# Patient Record
Sex: FEMALE | Race: WHITE | Employment: PART TIME | URBAN - METROPOLITAN AREA
[De-identification: names, ages, dates, MRNs, and addresses within clinical notes are randomized per-mention and may not be internally consistent; named-entity substitution may affect disease eponyms.]

---

## 2017-11-21 PROBLEM — M25.731 CARPAL BOSS OF RIGHT WRIST: Status: ACTIVE | Noted: 2017-11-21

## 2019-01-23 PROBLEM — N85.01 SIMPLE ENDOMETRIAL HYPERPLASIA WITHOUT ATYPIA: Status: ACTIVE | Noted: 2019-01-23

## 2019-01-23 PROBLEM — N94.10 DYSPAREUNIA, FEMALE: Status: ACTIVE | Noted: 2019-01-23

## 2019-01-25 ENCOUNTER — APPOINTMENT (OUTPATIENT)
Dept: CT IMAGING | Facility: HOSPITAL | Age: 29
End: 2019-01-25
Attending: FAMILY MEDICINE
Payer: COMMERCIAL

## 2019-01-25 ENCOUNTER — HOSPITAL ENCOUNTER (OUTPATIENT)
Age: 29
Discharge: HOME | End: 2019-01-25
Attending: FAMILY MEDICINE
Payer: COMMERCIAL

## 2019-01-25 VITALS
HEIGHT: 67 IN | OXYGEN SATURATION: 100 % | DIASTOLIC BLOOD PRESSURE: 74 MMHG | TEMPERATURE: 98 F | WEIGHT: 137 LBS | BODY MASS INDEX: 21.5 KG/M2 | RESPIRATION RATE: 18 BRPM | SYSTOLIC BLOOD PRESSURE: 107 MMHG | HEART RATE: 60 BPM

## 2019-01-25 DIAGNOSIS — K52.9 GASTROENTERITIS PRESUMED INFECTIOUS: Primary | ICD-10-CM

## 2019-01-25 DIAGNOSIS — R10.30 LOWER ABDOMINAL PAIN: ICD-10-CM

## 2019-01-25 LAB
#LYMPHOCYTE IC: 1.1 X10ˆ3/UL (ref 0.9–3.2)
#MXD IC: 0.5 X10ˆ3/UL (ref 0.1–1)
#NEUTROPHIL IC: 4.2 X10ˆ3/UL (ref 1.3–6.7)
CREAT SERPL-MCNC: 0.8 MG/DL (ref 0.55–1.02)
GLUCOSE BLD-MCNC: 77 MG/DL (ref 70–99)
HCT IC: 43.2 % (ref 37–54)
HGB IC: 13.9 G/DL (ref 12–16)
ISTAT BUN: 17 MG/DL (ref 8–20)
ISTAT CHLORIDE: 104 MMOL/L (ref 101–111)
ISTAT HEMATOCRIT: 43 % (ref 34–50)
ISTAT IONIZED CALCIUM: 1.18 MMOL/L
ISTAT POTASSIUM: 3.5 MMOL/L (ref 3.6–5.1)
ISTAT SODIUM: 141 MMOL/L (ref 136–144)
LYMPHOCYTES NFR BLD AUTO: 19.3 %
MCH IC: 30.3 PG (ref 27–33.2)
MCHC IC: 32.2 G/DL (ref 31–37)
MCV IC: 94.1 FL (ref 81–100)
MIXED CELL %: 8.5 %
NEUTROPHILS NFR BLD AUTO: 72.2 %
PLT IC: 186 X10ˆ3/UL (ref 150–450)
POCT URINE PREGNANCY: NEGATIVE
RBC IC: 4.59 X10ˆ6/UL (ref 3.8–5.1)
WBC IC: 5.8 X10ˆ3/UL (ref 4–13)

## 2019-01-25 PROCEDURE — 96374 THER/PROPH/DIAG INJ IV PUSH: CPT

## 2019-01-25 PROCEDURE — 80047 BASIC METABLC PNL IONIZED CA: CPT

## 2019-01-25 PROCEDURE — 81025 URINE PREGNANCY TEST: CPT | Performed by: FAMILY MEDICINE

## 2019-01-25 PROCEDURE — 96361 HYDRATE IV INFUSION ADD-ON: CPT

## 2019-01-25 PROCEDURE — 74177 CT ABD & PELVIS W/CONTRAST: CPT | Performed by: FAMILY MEDICINE

## 2019-01-25 PROCEDURE — 99214 OFFICE O/P EST MOD 30 MIN: CPT

## 2019-01-25 PROCEDURE — 85025 COMPLETE CBC W/AUTO DIFF WBC: CPT | Performed by: FAMILY MEDICINE

## 2019-01-25 PROCEDURE — 99204 OFFICE O/P NEW MOD 45 MIN: CPT

## 2019-01-25 RX ORDER — ONDANSETRON 4 MG/1
4 TABLET, ORALLY DISINTEGRATING ORAL EVERY 4 HOURS PRN
Qty: 10 TABLET | Refills: 0 | Status: SHIPPED | OUTPATIENT
Start: 2019-01-25 | End: 2019-02-01

## 2019-01-25 RX ORDER — KETOROLAC TROMETHAMINE 30 MG/ML
30 INJECTION, SOLUTION INTRAMUSCULAR; INTRAVENOUS ONCE
Status: COMPLETED | OUTPATIENT
Start: 2019-01-25 | End: 2019-01-25

## 2019-01-25 RX ORDER — SODIUM CHLORIDE 9 MG/ML
1000 INJECTION, SOLUTION INTRAVENOUS ONCE
Status: COMPLETED | OUTPATIENT
Start: 2019-01-25 | End: 2019-01-25

## 2019-01-25 RX ORDER — NAPROXEN 500 MG/1
500 TABLET ORAL 2 TIMES DAILY PRN
Qty: 20 TABLET | Refills: 0 | Status: SHIPPED | OUTPATIENT
Start: 2019-01-25 | End: 2019-02-01

## 2019-01-25 NOTE — ED INITIAL ASSESSMENT (HPI)
Pt arrived alert and responsive. Pt c/o diarrhea for 12 hours. Pt c/o at least 12 loose stools. Pt also c/o nausea and abdominal cramping. Pt states she is breast feeding.

## 2019-01-25 NOTE — ED NOTES
Pt to [facility] for ct abdomen. Pt instructed not to eat or drink. Pt also instructed to wait at [facility] for results to be called by Dr Marin Markham.

## 2019-01-25 NOTE — ED PROVIDER NOTES
Patient Seen in: [facility]    History   Patient presents with:  Nausea/Vomiting/Diarrhea (gastrointestinal)  Abdomen/Flank Pain (GI/)    Stated Complaint: diarrhea;body aches;abdominal pain     HPI    27-year-old female Never Smoker      Smokeless tobacco: Never Used    Alcohol use: Yes    Drug use: No      Review of Systems    Positive for stated complaint: diarrhea;body aches;abdominal pain   Other systems are as noted in HPI. Constitutional and vital signs reviewed. enterocolitis.  Clinical correlation recommended.  No large or small bowel dilatation.    No free air.  Minimal free fluid the pelvis. ABDOMINAL WALL:  Small fat containing umbilical hernia.   PELVIC ORGANS:  Decompressed urinary bladder.  Unremarkable [place] [id number]  [phone]  Go to   As needed        Medications Prescribed:  Discharge Medication List as of 1/25/2019  3:42 PM

## 2019-01-25 NOTE — ED NOTES
Pt tolerated ice chips but states it did cause her abdominal pain and has abdominal pain with anything ingested. MD notified.

## 2019-02-08 ENCOUNTER — HOSPITAL ENCOUNTER (EMERGENCY)
Age: 29
Discharge: HOME | End: 2019-02-08
Attending: EMERGENCY MEDICINE
Payer: COMMERCIAL

## 2019-02-08 ENCOUNTER — APPOINTMENT (OUTPATIENT)
Dept: GENERAL RADIOLOGY | Age: 29
End: 2019-02-08
Attending: NURSE PRACTITIONER
Payer: COMMERCIAL

## 2019-02-08 VITALS
RESPIRATION RATE: 18 BRPM | SYSTOLIC BLOOD PRESSURE: 139 MMHG | HEIGHT: 67 IN | TEMPERATURE: 99 F | DIASTOLIC BLOOD PRESSURE: 84 MMHG | WEIGHT: 135 LBS | BODY MASS INDEX: 21.19 KG/M2 | OXYGEN SATURATION: 99 % | HEART RATE: 84 BPM

## 2019-02-08 DIAGNOSIS — S61.211A LACERATION OF LEFT INDEX FINGER WITHOUT FOREIGN BODY WITHOUT DAMAGE TO NAIL, INITIAL ENCOUNTER: Primary | ICD-10-CM

## 2019-02-08 PROCEDURE — 12001 RPR S/N/AX/GEN/TRNK 2.5CM/<: CPT

## 2019-02-08 PROCEDURE — 73140 X-RAY EXAM OF FINGER(S): CPT | Performed by: NURSE PRACTITIONER

## 2019-02-08 PROCEDURE — 99283 EMERGENCY DEPT VISIT LOW MDM: CPT

## 2019-02-08 RX ORDER — IBUPROFEN 600 MG/1
600 TABLET ORAL ONCE
Status: COMPLETED | OUTPATIENT
Start: 2019-02-08 | End: 2019-02-08

## 2019-02-08 NOTE — ED PROVIDER NOTES
Patient Seen in: THE [facility] Emergency Department In [place]    History   Patient presents with:  Laceration Abrasion (integumentary)    Stated Complaint: left 2nd digit finger lac    42-year-old female presents today with injury to the left index finger. 139/84   Pulse 84   Resp 18   Temp 98.6 °F (37 °C)   Temp src Temporal   SpO2 99 %   O2 Device None (Room air)       Current:/84   Pulse 84   Temp 98.6 °F (37 °C) (Temporal)   Resp 18   Ht 170.2 cm (5' 7\")   Wt 61.2 kg   LMP 01/25/2019   SpO2 99% X-ray was done of the finger to ensure no fracture. X-ray was negative. Patient is up-to-date with Tdap.   Encouraged to do proper wound care and watch for signs of infection i.e. redness swelling or pus drainage if this happens follow with primary MD oth

## 2019-02-09 NOTE — ED PROVIDER NOTES
I reviewed that chart and discussed the case. I have examined the patient and noted laceration to lateral finger. Full range of motion. Sensation intact light touch. X-ray negative. Tetanus up-to-date. .      I agree with the following clinical impress

## 2019-02-16 ENCOUNTER — OFFICE VISIT (OUTPATIENT)
Dept: FAMILY MEDICINE CLINIC | Facility: CLINIC | Age: 29
End: 2019-02-16
Payer: COMMERCIAL

## 2019-02-16 VITALS
TEMPERATURE: 98 F | BODY MASS INDEX: 21.5 KG/M2 | WEIGHT: 137 LBS | DIASTOLIC BLOOD PRESSURE: 74 MMHG | SYSTOLIC BLOOD PRESSURE: 104 MMHG | HEIGHT: 67 IN | HEART RATE: 76 BPM | OXYGEN SATURATION: 98 %

## 2019-02-16 DIAGNOSIS — Z48.02 VISIT FOR SUTURE REMOVAL: Primary | ICD-10-CM

## 2019-02-16 PROCEDURE — 99024 POSTOP FOLLOW-UP VISIT: CPT | Performed by: NURSE PRACTITIONER

## 2019-02-16 NOTE — PROGRESS NOTES
Patient presents with:  Finger Injury: Seen in ED on 2/8 for left index finger cut. Patient was quilting and using rodery knife. Pateint denies discharge or bleeding. Patient still having some pain, denies any fever.  In today for stitch removal.       HPI:

## 2019-04-04 PROBLEM — M79.604 BILATERAL LEG PAIN: Status: ACTIVE | Noted: 2019-04-04

## 2019-04-04 PROBLEM — M54.50 BILATERAL LOW BACK PAIN WITHOUT SCIATICA: Status: ACTIVE | Noted: 2019-04-04

## 2019-04-04 PROBLEM — M79.605 BILATERAL LEG PAIN: Status: ACTIVE | Noted: 2019-04-04

## 2019-04-04 PROBLEM — M53.3 SI (SACROILIAC) JOINT DYSFUNCTION: Status: ACTIVE | Noted: 2019-04-04

## 2019-06-15 ENCOUNTER — HOSPITAL ENCOUNTER (EMERGENCY)
Age: 29
Discharge: HOME | End: 2019-06-15
Attending: EMERGENCY MEDICINE
Payer: COMMERCIAL

## 2019-06-15 VITALS
WEIGHT: 138 LBS | DIASTOLIC BLOOD PRESSURE: 86 MMHG | OXYGEN SATURATION: 99 % | BODY MASS INDEX: 22 KG/M2 | RESPIRATION RATE: 16 BRPM | HEART RATE: 80 BPM | TEMPERATURE: 98 F | SYSTOLIC BLOOD PRESSURE: 126 MMHG

## 2019-06-15 DIAGNOSIS — M12.811 ROTATOR CUFF ARTHROPATHY OF RIGHT SHOULDER: Primary | ICD-10-CM

## 2019-06-15 PROCEDURE — 99282 EMERGENCY DEPT VISIT SF MDM: CPT

## 2019-06-16 NOTE — ED PROVIDER NOTES
Patient Seen in: THE [facility] Emergency Department In [place]    History   Patient presents with:  Upper Extremity Injury (musculoskeletal)    Stated Complaint: right shoulder pain    HPI    17-year-old female presents the ER complaining of having right jerman Physical Exam  General: Nontoxic well-appearing 22-year-old female in no distress. Focused exam of the patient's right upper extremity: Good range of motion of the right shoulder is noted.   She is able to abduct and adduct the shoulder without dif

## 2019-06-16 NOTE — ED INITIAL ASSESSMENT (HPI)
States many yrs ago had a possible right shoulder rotator cuff tear. Today states heard clicking in her shoulder several times and having pain now.

## 2019-06-17 PROBLEM — M25.311 SHOULDER INSTABILITY, RIGHT: Status: ACTIVE | Noted: 2019-06-17

## 2019-06-25 PROBLEM — M75.21 BICEPS TENDINITIS, RIGHT: Status: ACTIVE | Noted: 2019-06-25

## 2019-07-08 PROCEDURE — 86376 MICROSOMAL ANTIBODY EACH: CPT | Performed by: PHYSICIAN ASSISTANT

## 2019-07-15 ENCOUNTER — APPOINTMENT (OUTPATIENT)
Dept: LAB | Facility: HOSPITAL | Age: 29
End: 2019-07-15
Attending: OBSTETRICS & GYNECOLOGY
Payer: COMMERCIAL

## 2019-07-15 DIAGNOSIS — N39.0 URINARY TRACT INFECTION WITHOUT HEMATURIA, SITE UNSPECIFIED: ICD-10-CM

## 2019-07-15 PROCEDURE — 87086 URINE CULTURE/COLONY COUNT: CPT

## 2019-10-16 ENCOUNTER — HOSPITAL ENCOUNTER (OUTPATIENT)
Dept: GENERAL RADIOLOGY | Age: 29
Discharge: HOME | End: 2019-10-16
Attending: OTOLARYNGOLOGY
Payer: COMMERCIAL

## 2019-10-16 DIAGNOSIS — R13.10 DYSPHAGIA, UNSPECIFIED TYPE: ICD-10-CM

## 2019-10-23 ENCOUNTER — HOSPITAL ENCOUNTER (OUTPATIENT)
Dept: GENERAL RADIOLOGY | Age: 29
Discharge: HOME | End: 2019-10-23
Attending: OTOLARYNGOLOGY
Payer: COMMERCIAL

## 2019-10-23 PROCEDURE — 74220 X-RAY XM ESOPHAGUS 1CNTRST: CPT | Performed by: OTOLARYNGOLOGY

## 2019-12-06 ENCOUNTER — OFFICE VISIT (OUTPATIENT)
Dept: FAMILY MEDICINE CLINIC | Facility: CLINIC | Age: 29
End: 2019-12-06
Payer: COMMERCIAL

## 2019-12-06 VITALS
TEMPERATURE: 99 F | SYSTOLIC BLOOD PRESSURE: 116 MMHG | DIASTOLIC BLOOD PRESSURE: 78 MMHG | HEIGHT: 67 IN | WEIGHT: 140 LBS | RESPIRATION RATE: 22 BRPM | BODY MASS INDEX: 21.97 KG/M2 | HEART RATE: 62 BPM | OXYGEN SATURATION: 98 %

## 2019-12-06 DIAGNOSIS — K11.21 ACUTE PAROTITIS: Primary | ICD-10-CM

## 2019-12-06 PROCEDURE — 99213 OFFICE O/P EST LOW 20 MIN: CPT | Performed by: NURSE PRACTITIONER

## 2019-12-06 RX ORDER — LORAZEPAM 0.5 MG/1
TABLET ORAL
Refills: 0 | COMMUNITY
Start: 2019-09-17 | End: 2020-06-24 | Stop reason: ALTCHOICE

## 2019-12-06 RX ORDER — SULFAMETHOXAZOLE AND TRIMETHOPRIM 800; 160 MG/1; MG/1
TABLET ORAL
Refills: 0 | COMMUNITY
Start: 2019-07-28 | End: 2019-12-06 | Stop reason: ALTCHOICE

## 2019-12-06 RX ORDER — AMOXICILLIN AND CLAVULANATE POTASSIUM 875; 125 MG/1; MG/1
1 TABLET, FILM COATED ORAL 2 TIMES DAILY
Qty: 28 TABLET | Refills: 0 | Status: SHIPPED | OUTPATIENT
Start: 2019-12-06 | End: 2019-12-20

## 2019-12-06 NOTE — PROGRESS NOTES
CHIEF COMPLAINT:   Patient presents with:  Swollen Glands: Left side behind ear,  X Today      HPI:   Opal Barragan is a 30year old female who presents to clinic today with complaints of pain behind left ear pain. Pt awoke with pain today.  Pain is de /78   Pulse 62   Temp 98.8 °F (37.1 °C) (Oral)   Resp 22   Ht 67\"   Wt 140 lb (63.5 kg)   LMP 11/29/2019   SpO2 98%   Breastfeeding No   BMI 21.93 kg/m²   GENERAL: well developed, well nourished,in no apparent distress  SKIN: no rashes,no suspicious Patient voiced understand and is in agreement with treatment plan. Patient Instructions     Salivary Gland Infection  Salivary glands make saliva. Saliva is mostly water.  It also has minerals and proteins that help break down food and keep the mouth and · If you smoke, ask your healthcare provider for help to quit. Smoking makes salivary gland stones more likely. · Keep good dental hygiene. Brush and floss your teeth daily. See your dentist for regular cleanings.   Follow-up care  Follow up with your heal

## 2020-01-07 ENCOUNTER — HOSPITAL ENCOUNTER (EMERGENCY)
Age: 30
Discharge: HOME | End: 2020-01-07
Attending: EMERGENCY MEDICINE
Payer: COMMERCIAL

## 2020-01-07 VITALS
BODY MASS INDEX: 22 KG/M2 | HEART RATE: 79 BPM | OXYGEN SATURATION: 99 % | DIASTOLIC BLOOD PRESSURE: 71 MMHG | RESPIRATION RATE: 16 BRPM | WEIGHT: 143.31 LBS | TEMPERATURE: 98 F | SYSTOLIC BLOOD PRESSURE: 118 MMHG

## 2020-01-07 DIAGNOSIS — I49.3 SYMPTOMATIC PVCS: Primary | ICD-10-CM

## 2020-01-07 LAB
ANION GAP SERPL CALC-SCNC: 7 MMOL/L (ref 0–18)
BASOPHILS # BLD AUTO: 0.04 X10(3) UL (ref 0–0.2)
BASOPHILS NFR BLD AUTO: 0.7 %
BUN BLD-MCNC: 15 MG/DL (ref 7–18)
BUN/CREAT SERPL: 18.8 (ref 10–20)
CALCIUM BLD-MCNC: 8.7 MG/DL (ref 8.5–10.1)
CHLORIDE SERPL-SCNC: 104 MMOL/L (ref 98–112)
CO2 SERPL-SCNC: 26 MMOL/L (ref 21–32)
CREAT BLD-MCNC: 0.8 MG/DL (ref 0.55–1.02)
DEPRECATED RDW RBC AUTO: 42.9 FL (ref 35.1–46.3)
EOSINOPHIL # BLD AUTO: 0.19 X10(3) UL (ref 0–0.7)
EOSINOPHIL NFR BLD AUTO: 3.4 %
ERYTHROCYTE [DISTWIDTH] IN BLOOD BY AUTOMATED COUNT: 12.4 % (ref 11–15)
GLUCOSE BLD-MCNC: 82 MG/DL (ref 70–99)
HCT VFR BLD AUTO: 41.8 % (ref 35–48)
HGB BLD-MCNC: 13.5 G/DL (ref 12–16)
IMM GRANULOCYTES # BLD AUTO: 0.01 X10(3) UL (ref 0–1)
IMM GRANULOCYTES NFR BLD: 0.2 %
LYMPHOCYTES # BLD AUTO: 2.16 X10(3) UL (ref 1–4)
LYMPHOCYTES NFR BLD AUTO: 38.8 %
MCH RBC QN AUTO: 30.2 PG (ref 26–34)
MCHC RBC AUTO-ENTMCNC: 32.3 G/DL (ref 31–37)
MCV RBC AUTO: 93.5 FL (ref 80–100)
MONOCYTES # BLD AUTO: 0.51 X10(3) UL (ref 0.1–1)
MONOCYTES NFR BLD AUTO: 9.2 %
NEUTROPHILS # BLD AUTO: 2.65 X10 (3) UL (ref 1.5–7.7)
NEUTROPHILS # BLD AUTO: 2.65 X10(3) UL (ref 1.5–7.7)
NEUTROPHILS NFR BLD AUTO: 47.7 %
OSMOLALITY SERPL CALC.SUM OF ELEC: 284 MOSM/KG (ref 275–295)
PLATELET # BLD AUTO: 164 10(3)UL (ref 150–450)
POTASSIUM SERPL-SCNC: 3.6 MMOL/L (ref 3.5–5.1)
RBC # BLD AUTO: 4.47 X10(6)UL (ref 3.8–5.3)
SODIUM SERPL-SCNC: 137 MMOL/L (ref 136–145)
TROPONIN I SERPL-MCNC: <0.045 NG/ML (ref ?–0.04)
WBC # BLD AUTO: 5.6 X10(3) UL (ref 4–11)

## 2020-01-07 PROCEDURE — 99284 EMERGENCY DEPT VISIT MOD MDM: CPT | Performed by: EMERGENCY MEDICINE

## 2020-01-07 PROCEDURE — 84484 ASSAY OF TROPONIN QUANT: CPT | Performed by: EMERGENCY MEDICINE

## 2020-01-07 PROCEDURE — 36415 COLL VENOUS BLD VENIPUNCTURE: CPT | Performed by: EMERGENCY MEDICINE

## 2020-01-07 PROCEDURE — 93005 ELECTROCARDIOGRAM TRACING: CPT

## 2020-01-07 PROCEDURE — 85025 COMPLETE CBC W/AUTO DIFF WBC: CPT | Performed by: EMERGENCY MEDICINE

## 2020-01-07 PROCEDURE — 80048 BASIC METABOLIC PNL TOTAL CA: CPT | Performed by: EMERGENCY MEDICINE

## 2020-01-07 PROCEDURE — 93010 ELECTROCARDIOGRAM REPORT: CPT | Performed by: EMERGENCY MEDICINE

## 2020-01-08 LAB
ATRIAL RATE: 81 BPM
P AXIS: 59 DEGREES
P-R INTERVAL: 160 MS
Q-T INTERVAL: 384 MS
QRS DURATION: 80 MS
QTC CALCULATION (BEZET): 446 MS
R AXIS: 23 DEGREES
T AXIS: -40 DEGREES
VENTRICULAR RATE: 81 BPM

## 2020-01-08 NOTE — ED PROVIDER NOTES
Patient Seen in: [facility] Emergency Department In [place]      History   Patient presents with:  Arrythmia/Palpitations    Stated Complaint: palpitations since Sunday    HPI    Patient reports a sensation of palpitations since Sunday.   Patient describes except as noted above.     Physical Exam     ED Triage Vitals [01/07/20 2006]   /86   Pulse 87   Resp 18   Temp 98.3 °F (36.8 °C)   Temp src Temporal   SpO2 99 %   O2 Device None (Room air)       Current:/71   Pulse 79   Temp 98.3 °F (36.8 °C) ( rate 81 bpm. MN interval 160 ms. QRS duration 80 ms. MDM     Patient describes what sounds like PVCs. Even what she describes on her ECG tracing could be PVCs.   During my evaluation, I was watching the monitor and she had no irregular beats and n

## 2020-03-07 ENCOUNTER — OFFICE VISIT (OUTPATIENT)
Dept: FAMILY MEDICINE CLINIC | Facility: CLINIC | Age: 30
End: 2020-03-07
Payer: COMMERCIAL

## 2020-03-07 VITALS
HEART RATE: 104 BPM | DIASTOLIC BLOOD PRESSURE: 60 MMHG | OXYGEN SATURATION: 99 % | RESPIRATION RATE: 18 BRPM | BODY MASS INDEX: 21.66 KG/M2 | HEIGHT: 67 IN | TEMPERATURE: 98 F | WEIGHT: 138 LBS | SYSTOLIC BLOOD PRESSURE: 108 MMHG

## 2020-03-07 DIAGNOSIS — H66.001 ACUTE SUPPURATIVE OTITIS MEDIA OF RIGHT EAR WITHOUT SPONTANEOUS RUPTURE OF TYMPANIC MEMBRANE, RECURRENCE NOT SPECIFIED: ICD-10-CM

## 2020-03-07 DIAGNOSIS — H61.21 IMPACTED CERUMEN OF RIGHT EAR: Primary | ICD-10-CM

## 2020-03-07 PROCEDURE — 69209 REMOVE IMPACTED EAR WAX UNI: CPT | Performed by: NURSE PRACTITIONER

## 2020-03-07 PROCEDURE — 99213 OFFICE O/P EST LOW 20 MIN: CPT | Performed by: NURSE PRACTITIONER

## 2020-03-07 RX ORDER — AMOXICILLIN 875 MG/1
875 TABLET, COATED ORAL 2 TIMES DAILY
Qty: 20 TABLET | Refills: 0 | Status: SHIPPED | OUTPATIENT
Start: 2020-03-07 | End: 2020-03-17

## 2020-03-16 NOTE — PROGRESS NOTES
CHIEF COMPLAINT:   Patient presents with:  Ear Problem: bilateral clogged ears, and congreation s/s for 2-3 days       HPI:   Julianna Omalley is a 26year old female who presents for upper respiratory symptoms for  3 days.  Patient reports congestion, bila Denies headaches    EXAM:   /60   Pulse 104   Temp 98.4 °F (36.9 °C) (Oral)   Resp 18   Ht 67\"   Wt 138 lb (62.6 kg)   LMP 03/05/2020 (Exact Date)   SpO2 99%   Breastfeeding No   BMI 21.61 kg/m²   GENERAL: well developed, well nourished, and in no a tablet (875 mg total) by mouth 2 (two) times daily for 10 days. Risks, benefits, and side effects of medication explained and discussed. The patient indicates understanding of these issues and agrees to the plan.   The patient is asked to return if

## 2020-09-14 PROBLEM — G57.82: Status: ACTIVE | Noted: 2020-09-14

## 2021-03-29 PROBLEM — L03.313 CELLULITIS OF CHEST WALL: Status: ACTIVE | Noted: 2021-03-29

## 2021-09-12 ENCOUNTER — OFFICE VISIT (OUTPATIENT)
Dept: FAMILY MEDICINE CLINIC | Facility: CLINIC | Age: 31
End: 2021-09-12
Payer: COMMERCIAL

## 2021-09-12 VITALS
RESPIRATION RATE: 18 BRPM | DIASTOLIC BLOOD PRESSURE: 70 MMHG | OXYGEN SATURATION: 98 % | WEIGHT: 138 LBS | BODY MASS INDEX: 21.66 KG/M2 | TEMPERATURE: 98 F | HEIGHT: 67 IN | HEART RATE: 112 BPM | SYSTOLIC BLOOD PRESSURE: 138 MMHG
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
## 2021-09-12 DIAGNOSIS — S90.562A INSECT BITE OF LEFT ANKLE, INITIAL ENCOUNTER: Primary | ICD-10-CM

## 2021-09-12 PROCEDURE — 99213 OFFICE O/P EST LOW 20 MIN: CPT | Performed by: NURSE PRACTITIONER

## 2021-09-12 PROCEDURE — 3078F DIAST BP <80 MM HG: CPT | Performed by: NURSE PRACTITIONER

## 2021-09-12 PROCEDURE — 3075F SYST BP GE 130 - 139MM HG: CPT | Performed by: NURSE PRACTITIONER

## 2021-09-12 PROCEDURE — 3008F BODY MASS INDEX DOCD: CPT | Performed by: NURSE PRACTITIONER

## 2021-09-12 NOTE — PROGRESS NOTES
Patient presents with:  Bite Sting,Insect: ankle left    HPI:     Marce Emanuel is a 33year old female who presents today with a chief complaint of insect bite to left ankle   Onset:  2 days, less than 48 hours ago  Location of bite and affected area: percussion or palpation  Lungs: clear to auscultation bilaterally  Heart: S1, S2 normal, no murmur, click, rub or gallop, regular rate and rhythm  Extremities: extremities normal, atraumatic, no cyanosis or edema  Pulses: 2+ and symmetric  Skin:  No rash n

## 2021-11-09 ENCOUNTER — ORDER TRANSCRIPTION (OUTPATIENT)
Dept: ADMINISTRATIVE | Facility: HOSPITAL | Age: 31
End: 2021-11-09

## 2021-11-09 DIAGNOSIS — Z11.59 ENCOUNTER FOR SCREENING FOR OTHER VIRAL DISEASES: ICD-10-CM

## 2021-11-09 DIAGNOSIS — Z01.818 PRE-OP TESTING: Primary | ICD-10-CM

## 2021-12-07 ENCOUNTER — LAB ENCOUNTER (OUTPATIENT)
Dept: LAB | Age: 31
End: 2021-12-07
Attending: OTOLARYNGOLOGY
Payer: COMMERCIAL

## 2021-12-07 DIAGNOSIS — Z01.818 PRE-OP TESTING: ICD-10-CM

## 2021-12-07 DIAGNOSIS — Z11.59 ENCOUNTER FOR SCREENING FOR OTHER VIRAL DISEASES: ICD-10-CM

## 2021-12-10 ENCOUNTER — HOSPITAL ENCOUNTER (OUTPATIENT)
Dept: GENERAL RADIOLOGY | Facility: HOSPITAL | Age: 31
Discharge: HOME | End: 2021-12-10
Attending: OTOLARYNGOLOGY
Payer: COMMERCIAL

## 2021-12-10 DIAGNOSIS — R13.10 DYSPHAGIA, UNSPECIFIED TYPE: ICD-10-CM

## 2021-12-10 PROCEDURE — 92611 MOTION FLUOROSCOPY/SWALLOW: CPT

## 2021-12-10 PROCEDURE — 74230 X-RAY XM SWLNG FUNCJ C+: CPT | Performed by: OTOLARYNGOLOGY

## 2021-12-10 NOTE — PROGRESS NOTES
ADULT VIDEOFLUOROSCOPIC SWALLOWING STUDY    Admission Date: 12/10/2021  Evaluation Date: 12/10/21  Radiologist: Cary Schmitt    RECOMMENDATIONS   Diet Recommendations - Solids: Regular (continue extra sauces/gravies/broth or \"wet diet\" for her comfort) Steven Michelle. Scope done and said to try prilosec for 2 weeks. Not much better. Patient currently pregnant about 15 weeks. Does also have dysphagia. For years 7-8. Pills and solids. At times with liquids.   Had Ba swallow with Dr. Steven Michelle showing mild reflu Aspiration: None  Strategy(ies) Implemented (Thin Liquids): Slow rate           PUREE  Oral Phase of Swallow (VFSS - Puree):  Within Functional Limits  Triggered at: Base of tongue  Delay (seconds):  (timely)  Residue Severity, Location:  (none)  Laryngeal premature spillage over the base of the tongue and cohesive boluses were maintained in the oral cavity until swallow reflex was triggered. Pharyngeal Phase of the Swallow:   The swallow response was timely and initiated at the level of the ramus/base of

## 2021-12-11 NOTE — PROGRESS NOTES
Please call patient. Video swallow normal.  No issues seen. Suspect sensation secondary to reflux inflammation.

## 2021-12-13 NOTE — PROGRESS NOTES
I think GI would be the next logical step. If that workup proves unfruitful or unhelpful then possibly Laryngology.

## 2021-12-13 NOTE — PROGRESS NOTES
Recommendations given to patient.  She is wondering what the next step is since she is still having issues with swallowing - to GI?

## 2022-03-15 ENCOUNTER — OFFICE VISIT (OUTPATIENT)
Dept: FAMILY MEDICINE CLINIC | Facility: CLINIC | Age: 32
End: 2022-03-15
Payer: COMMERCIAL

## 2022-03-15 VITALS
OXYGEN SATURATION: 98 % | HEART RATE: 77 BPM | HEIGHT: 67 IN | SYSTOLIC BLOOD PRESSURE: 110 MMHG | DIASTOLIC BLOOD PRESSURE: 80 MMHG | WEIGHT: 140 LBS | RESPIRATION RATE: 18 BRPM | TEMPERATURE: 98 F | BODY MASS INDEX: 21.97 KG/M2

## 2022-03-15 DIAGNOSIS — Z20.822 SUSPECTED COVID-19 VIRUS INFECTION: ICD-10-CM

## 2022-03-15 DIAGNOSIS — J01.40 ACUTE PANSINUSITIS, RECURRENCE NOT SPECIFIED: Primary | ICD-10-CM

## 2022-03-15 PROCEDURE — 99213 OFFICE O/P EST LOW 20 MIN: CPT | Performed by: NURSE PRACTITIONER

## 2022-03-15 PROCEDURE — 3079F DIAST BP 80-89 MM HG: CPT | Performed by: NURSE PRACTITIONER

## 2022-03-15 PROCEDURE — 3074F SYST BP LT 130 MM HG: CPT | Performed by: NURSE PRACTITIONER

## 2022-03-15 PROCEDURE — 3008F BODY MASS INDEX DOCD: CPT | Performed by: NURSE PRACTITIONER

## 2022-03-15 RX ORDER — AMOXICILLIN AND CLAVULANATE POTASSIUM 875; 125 MG/1; MG/1
1 TABLET, FILM COATED ORAL 2 TIMES DAILY
Qty: 20 TABLET | Refills: 0 | Status: SHIPPED | OUTPATIENT
Start: 2022-03-15 | End: 2022-03-25

## 2022-03-15 RX ORDER — BENZONATATE 200 MG/1
200 CAPSULE ORAL 3 TIMES DAILY PRN
Qty: 20 CAPSULE | Refills: 0 | Status: SHIPPED | OUTPATIENT
Start: 2022-03-15 | End: 2022-03-22

## 2022-03-15 RX ORDER — ALBUTEROL SULFATE 90 UG/1
2 AEROSOL, METERED RESPIRATORY (INHALATION) EVERY 4 HOURS PRN
Qty: 1 EACH | Refills: 0 | Status: SHIPPED | OUTPATIENT
Start: 2022-03-15 | End: 2022-03-29

## 2022-03-16 LAB — SARS-COV-2 RNA RESP QL NAA+PROBE: NOT DETECTED

## 2022-05-16 ENCOUNTER — HOSPITAL ENCOUNTER (EMERGENCY)
Age: 32
Discharge: HOME | End: 2022-05-16
Payer: COMMERCIAL

## 2022-05-16 VITALS
BODY MASS INDEX: 21.97 KG/M2 | DIASTOLIC BLOOD PRESSURE: 67 MMHG | WEIGHT: 140 LBS | HEART RATE: 82 BPM | RESPIRATION RATE: 16 BRPM | TEMPERATURE: 98 F | OXYGEN SATURATION: 100 % | SYSTOLIC BLOOD PRESSURE: 124 MMHG | HEIGHT: 67 IN

## 2022-05-16 DIAGNOSIS — S91.011A LACERATION OF RIGHT ANKLE, INITIAL ENCOUNTER: Primary | ICD-10-CM

## 2022-05-16 PROCEDURE — 12001 RPR S/N/AX/GEN/TRNK 2.5CM/<: CPT | Performed by: NURSE PRACTITIONER

## 2022-05-16 PROCEDURE — 99283 EMERGENCY DEPT VISIT LOW MDM: CPT | Performed by: NURSE PRACTITIONER

## 2022-05-16 NOTE — ED INITIAL ASSESSMENT (HPI)
Pt to ed after hitting right heel on aq back of the door, + laceration, bleeding controlled, last tetanus 3 years ago

## 2022-05-31 ENCOUNTER — TELEPHONE (OUTPATIENT)
Dept: ORTHOPEDICS CLINIC | Facility: CLINIC | Age: 32
End: 2022-05-31

## 2022-05-31 DIAGNOSIS — M25.571 RIGHT ANKLE PAIN, UNSPECIFIED CHRONICITY: Primary | ICD-10-CM

## 2022-05-31 NOTE — TELEPHONE ENCOUNTER
Please contact patient to inform them to arrive 30 min early to complete xrays and schedule xray appointment.

## 2022-06-03 ENCOUNTER — OFFICE VISIT (OUTPATIENT)
Dept: ORTHOPEDICS CLINIC | Facility: CLINIC | Age: 32
End: 2022-06-03
Payer: COMMERCIAL

## 2022-06-03 VITALS — HEIGHT: 67 IN | WEIGHT: 140 LBS | BODY MASS INDEX: 21.97 KG/M2

## 2022-06-03 DIAGNOSIS — S91.311A LACERATION OF RIGHT FOOT, INITIAL ENCOUNTER: Primary | ICD-10-CM

## 2022-06-03 DIAGNOSIS — L90.5 SCAR TISSUE: ICD-10-CM

## 2022-06-03 DIAGNOSIS — S96.911A TEAR OF TENDON OF RIGHT ANKLE, INITIAL ENCOUNTER: ICD-10-CM

## 2022-06-03 PROCEDURE — 3008F BODY MASS INDEX DOCD: CPT | Performed by: PODIATRIST

## 2022-06-03 PROCEDURE — 99204 OFFICE O/P NEW MOD 45 MIN: CPT | Performed by: PODIATRIST

## 2022-06-03 RX ORDER — CLOTRIMAZOLE 1 %
1 CREAM (GRAM) TOPICAL 2 TIMES DAILY
COMMUNITY
Start: 2022-04-26

## 2022-06-17 ENCOUNTER — OFFICE VISIT (OUTPATIENT)
Dept: ORTHOPEDICS CLINIC | Facility: CLINIC | Age: 32
End: 2022-06-17
Payer: COMMERCIAL

## 2022-06-17 DIAGNOSIS — S96.911D TEAR OF TENDON OF RIGHT ANKLE, SUBSEQUENT ENCOUNTER: ICD-10-CM

## 2022-06-17 DIAGNOSIS — S91.311D LACERATION OF RIGHT FOOT, SUBSEQUENT ENCOUNTER: Primary | ICD-10-CM

## 2022-06-17 DIAGNOSIS — L90.5 SCAR TISSUE: ICD-10-CM

## 2022-06-17 PROCEDURE — 99213 OFFICE O/P EST LOW 20 MIN: CPT | Performed by: PODIATRIST

## 2022-06-17 RX ORDER — MELOXICAM 15 MG/1
15 TABLET ORAL DAILY
COMMUNITY
Start: 2022-06-06

## 2022-06-17 NOTE — PROGRESS NOTES
[facility] Progress Note    Subjective:     Juan Young is here for follow-up of the laceration posterior aspect of her right heel area. Questionable extension of the laceration to the tendon. She has had 2 sessions of therapy. She did use the boot  2 weeks    Objective:     Posterior aspect of the right heel she has palpable scar tissue. Full strength of the Achilles tendon but a little bit of discomfort with testing. Mild swelling no warmth                  Assessment/Plan:     Diagnoses and all orders for this visit:    Laceration of right foot, subsequent encounter  -     PHYSICAL THERAPY EXTERNAL    Scar tissue  -     PHYSICAL THERAPY EXTERNAL    Tear of tendon of right ankle, subsequent encounter  -     PHYSICAL THERAPY EXTERNAL        Continue with physical therapy. She did use the boot for 2 weeks which I think was helpful  What ever shoes are comfortable but no running or jumping activity as far as working out. Follow-up after physical therapy          Gibson Renae. Manasa Morton DPM  [facility]    Smart [employer]oon speech recognition software was used to prepare this note. If a word or phrase is confusing, it is likely do to a failure of recognition. Please contact me with any questions or clarifications.

## 2022-10-20 ENCOUNTER — OFFICE VISIT (OUTPATIENT)
Dept: ORTHOPEDICS CLINIC | Facility: CLINIC | Age: 32
End: 2022-10-20
Payer: COMMERCIAL

## 2022-10-20 VITALS — HEIGHT: 67 IN | BODY MASS INDEX: 21.97 KG/M2 | WEIGHT: 140 LBS

## 2022-10-20 DIAGNOSIS — S91.311S LACERATION OF RIGHT FOOT, SEQUELA: Primary | ICD-10-CM

## 2022-10-20 DIAGNOSIS — L90.5 SCAR TISSUE: ICD-10-CM

## 2022-10-20 PROBLEM — S91.311A LACERATION OF RIGHT FOOT: Status: ACTIVE | Noted: 2022-10-20

## 2022-10-20 PROCEDURE — 3008F BODY MASS INDEX DOCD: CPT | Performed by: PODIATRIST

## 2022-10-20 PROCEDURE — 99213 OFFICE O/P EST LOW 20 MIN: CPT | Performed by: PODIATRIST

## 2022-10-20 NOTE — PROGRESS NOTES
[facility] Progress Note    Subjective:     Ema Craig is here to discuss her next options for her right heel area. She had a laceration back several months ago and developed some scar tissue and tendinitis of the Achilles. She is to the point now where she is considering surgery. She had a visit with another provider and basically Jer's type of surgery was recommended and she just wants to get another opinion        Objective:     Exam area is movable sort of a scar ball the ulceration is well-healed posterior aspect of the right heel  Mild redness and swelling probably some bursitis as well                  Assessment/Plan:     Diagnoses and all orders for this visit:    Laceration of right foot, sequela    Scar tissue        We discussed how this has progressed and how it is to the point now where it bothers her and she is not able to get into shoes. She wonders if an MRI again or an ultrasound would help and I think she should just go ahead with the surgery and I discussed what would probably be done and recommended. She is having enough discomfort that it is time to proceed with that. She will follow-up with Kirill Wood. Alana Stallings DPM  [facility]    [employer] speech recognition software was used to prepare this note. If a word or phrase is confusing, it is likely do to a failure of recognition. Please contact me with any questions or clarifications.

## 2023-02-28 DIAGNOSIS — R13.10 DYSPHAGIA: Primary | ICD-10-CM

## 2023-03-24 ENCOUNTER — HOSPITAL ENCOUNTER (OUTPATIENT)
Dept: GENERAL RADIOLOGY | Age: 33
Discharge: HOME | End: 2023-03-24
Attending: INTERNAL MEDICINE

## 2023-03-24 DIAGNOSIS — R13.10 DYSPHAGIA: ICD-10-CM

## 2023-03-24 PROCEDURE — 74221 X-RAY XM ESOPHAGUS 2CNTRST: CPT

## 2023-04-03 ENCOUNTER — MED REC SCAN ONLY (OUTPATIENT)
Dept: INTEGRATIVE MEDICINE | Facility: CLINIC | Age: 33
End: 2023-04-03

## 2023-11-07 ENCOUNTER — HOSPITAL ENCOUNTER (OUTPATIENT)
Dept: CT IMAGING | Age: 33
Discharge: HOME | End: 2023-11-07
Attending: INTERNAL MEDICINE
Payer: COMMERCIAL

## 2023-11-07 DIAGNOSIS — R07.81 RIB PAIN: ICD-10-CM

## 2023-11-07 DIAGNOSIS — R10.9 ABDOMINAL PAIN: ICD-10-CM

## 2023-11-07 PROCEDURE — 74177 CT ABD & PELVIS W/CONTRAST: CPT | Performed by: INTERNAL MEDICINE

## 2023-11-07 PROCEDURE — 82565 ASSAY OF CREATININE: CPT

## 2023-11-07 PROCEDURE — 71260 CT THORAX DX C+: CPT | Performed by: INTERNAL MEDICINE

## 2023-11-07 RX ORDER — IOHEXOL 350 MG/ML
100 INJECTION, SOLUTION INTRAVENOUS
Status: COMPLETED | OUTPATIENT
Start: 2023-11-07 | End: 2023-11-07

## 2023-11-07 RX ADMIN — IOHEXOL 100 ML: 350 INJECTION, SOLUTION INTRAVENOUS at 13:35:00

## 2023-11-08 LAB
CREAT BLD-MCNC: 0.8 MG/DL
EGFRCR SERPLBLD CKD-EPI 2021: 98 ML/MIN/1.73M2 (ref 60–?)

## 2024-01-15 ENCOUNTER — E-ADVICE (OUTPATIENT)
Dept: ALLERGY | Age: 34
End: 2024-01-15

## 2024-01-17 ENCOUNTER — HOSPITAL ENCOUNTER (EMERGENCY)
Facility: HOSPITAL | Age: 34
Discharge: HOME | End: 2024-01-17
Attending: EMERGENCY MEDICINE
Payer: COMMERCIAL

## 2024-01-17 VITALS
HEIGHT: 67 IN | WEIGHT: 140 LBS | DIASTOLIC BLOOD PRESSURE: 89 MMHG | SYSTOLIC BLOOD PRESSURE: 133 MMHG | HEART RATE: 75 BPM | OXYGEN SATURATION: 98 % | BODY MASS INDEX: 21.97 KG/M2 | RESPIRATION RATE: 16 BRPM | TEMPERATURE: 98 F

## 2024-01-17 DIAGNOSIS — R00.2 PALPITATIONS: Primary | ICD-10-CM

## 2024-01-17 LAB
ALBUMIN SERPL-MCNC: 3.8 G/DL (ref 3.4–5)
ALBUMIN/GLOB SERPL: 1.2 {RATIO} (ref 1–2)
ALP LIVER SERPL-CCNC: 65 U/L
ALT SERPL-CCNC: 18 U/L
ANION GAP SERPL CALC-SCNC: 5 MMOL/L (ref 0–18)
AST SERPL-CCNC: 7 U/L (ref 15–37)
ATRIAL RATE: 68 BPM
BASOPHILS # BLD AUTO: 0.05 X10(3) UL (ref 0–0.2)
BASOPHILS NFR BLD AUTO: 1.2 %
BILIRUB SERPL-MCNC: 0.2 MG/DL (ref 0.1–2)
BUN BLD-MCNC: 10 MG/DL (ref 9–23)
CALCIUM BLD-MCNC: 8.9 MG/DL (ref 8.5–10.1)
CHLORIDE SERPL-SCNC: 105 MMOL/L (ref 98–112)
CO2 SERPL-SCNC: 30 MMOL/L (ref 21–32)
CREAT BLD-MCNC: 0.95 MG/DL
EGFRCR SERPLBLD CKD-EPI 2021: 80 ML/MIN/1.73M2 (ref 60–?)
EOSINOPHIL # BLD AUTO: 0.23 X10(3) UL (ref 0–0.7)
EOSINOPHIL NFR BLD AUTO: 5.4 %
ERYTHROCYTE [DISTWIDTH] IN BLOOD BY AUTOMATED COUNT: 12.6 %
GLOBULIN PLAS-MCNC: 3.1 G/DL (ref 2.8–4.4)
GLUCOSE BLD-MCNC: 103 MG/DL (ref 70–99)
HCT VFR BLD AUTO: 38.6 %
HGB BLD-MCNC: 12.6 G/DL
IMM GRANULOCYTES # BLD AUTO: 0.01 X10(3) UL (ref 0–1)
IMM GRANULOCYTES NFR BLD: 0.2 %
LYMPHOCYTES # BLD AUTO: 1.28 X10(3) UL (ref 1–4)
LYMPHOCYTES NFR BLD AUTO: 30.1 %
MCH RBC QN AUTO: 29.6 PG (ref 26–34)
MCHC RBC AUTO-ENTMCNC: 32.6 G/DL (ref 31–37)
MCV RBC AUTO: 90.8 FL
MONOCYTES # BLD AUTO: 0.44 X10(3) UL (ref 0.1–1)
MONOCYTES NFR BLD AUTO: 10.4 %
NEUTROPHILS # BLD AUTO: 2.24 X10 (3) UL (ref 1.5–7.7)
NEUTROPHILS # BLD AUTO: 2.24 X10(3) UL (ref 1.5–7.7)
NEUTROPHILS NFR BLD AUTO: 52.7 %
OSMOLALITY SERPL CALC.SUM OF ELEC: 289 MOSM/KG (ref 275–295)
P AXIS: 146 DEGREES
P-R INTERVAL: 152 MS
PLATELET # BLD AUTO: 161 10(3)UL (ref 150–450)
POTASSIUM SERPL-SCNC: 3.3 MMOL/L (ref 3.5–5.1)
PROT SERPL-MCNC: 6.9 G/DL (ref 6.4–8.2)
Q-T INTERVAL: 394 MS
QRS DURATION: 80 MS
QTC CALCULATION (BEZET): 418 MS
R AXIS: 146 DEGREES
RBC # BLD AUTO: 4.25 X10(6)UL
SODIUM SERPL-SCNC: 140 MMOL/L (ref 136–145)
T AXIS: 50 DEGREES
TROPONIN I SERPL HS-MCNC: 3 NG/L
VENTRICULAR RATE: 68 BPM
WBC # BLD AUTO: 4.3 X10(3) UL (ref 4–11)

## 2024-01-17 PROCEDURE — 93005 ELECTROCARDIOGRAM TRACING: CPT

## 2024-01-17 PROCEDURE — 99284 EMERGENCY DEPT VISIT MOD MDM: CPT

## 2024-01-17 PROCEDURE — 36415 COLL VENOUS BLD VENIPUNCTURE: CPT

## 2024-01-17 PROCEDURE — 85025 COMPLETE CBC W/AUTO DIFF WBC: CPT | Performed by: EMERGENCY MEDICINE

## 2024-01-17 PROCEDURE — 85025 COMPLETE CBC W/AUTO DIFF WBC: CPT

## 2024-01-17 PROCEDURE — 80053 COMPREHEN METABOLIC PANEL: CPT

## 2024-01-17 PROCEDURE — 93010 ELECTROCARDIOGRAM REPORT: CPT

## 2024-01-17 PROCEDURE — 84484 ASSAY OF TROPONIN QUANT: CPT | Performed by: EMERGENCY MEDICINE

## 2024-01-17 PROCEDURE — 80053 COMPREHEN METABOLIC PANEL: CPT | Performed by: EMERGENCY MEDICINE

## 2024-01-17 RX ORDER — PANTOPRAZOLE SODIUM 40 MG/1
40 TABLET, DELAYED RELEASE ORAL
COMMUNITY

## 2024-01-18 LAB
ATRIAL RATE: 69 BPM
P AXIS: 80 DEGREES
P-R INTERVAL: 152 MS
Q-T INTERVAL: 414 MS
QRS DURATION: 82 MS
QTC CALCULATION (BEZET): 443 MS
R AXIS: 83 DEGREES
T AXIS: 49 DEGREES
VENTRICULAR RATE: 69 BPM

## 2024-01-18 NOTE — ED INITIAL ASSESSMENT (HPI)
Patient to the ED via [facility] with c/o dizziness and rapid heart rate while driving. States she has hx of EOE. States she has had this happen before, which after a few mins she recovers. States she has been to [facility] where she rec'd a clean bill of health. Thinking this could be related to post-procedure reaction but she has not had a procedure recently. States when she was driving she felt faint, her rapid HR.

## 2024-01-18 NOTE — ED PROVIDER NOTES
Patient Seen in: [facility] Emergency Department      History     Chief Complaint   Patient presents with    Dizziness    Tachycardia     Stated Complaint: Dizzy while driving.    Subjective:   HPI    Patient is a 33-year-old female with history of eosinophilic esophagitis, hiatal hernia, anxiety among other medical pounds outlined below presents to the ED for evaluation for palpitations dizziness while driving.  Patient was on her way to get an MRI of her knee but approximately a little bit after 6 PM, patient started having palpitations heart rate was elevated she felt short of breath and dizzy but no syncope.  She pulled over an episode last about 10 minutes and started to improve.  EMS was called.  Patient went to the fire station and was feeling better had a normal bowel movement and then came to the ED for evaluation.  No vomiting diarrhea.  No syncope.  No focal motor or sensory gait vision speech problems.  She has had similar episodes of this in the past.  1 time after a EGD.  Previously has seen cardiology at [facility] and has had a Holter monitor in the past as well which has been unrevealing.  No fever or chills.  No constitutional symptoms.  No abdominal pain.  No other complaints.    Objective:   Past Medical History:   Diagnosis Date    ALLERGIC RHINITIS     Anxiety     Cyst, thyroid     has appt to see Endocrinologist February 2017    Endometrial hyperplasia     encapsalated placenta    HEADACHES     migraines    Homozygous Factor V Leiden mutation (HCC)     Polyp at cervical os     uterine polyp removal              Past Surgical History:   Procedure Laterality Date    D & C  02/2016    with hysteroscopy, benign uterine polyp    HYSTEROSCOPY  2018    D&C encapsalated placenta    OTHER SURGICAL HISTORY  12/2015    wisdom teeth    TONSILLECTOMY  1991                Social History     Socioeconomic History    Marital status:    Tobacco Use    Smoking status: Never    Smokeless tobacco: Never    Vaping Use    Vaping Use: Never used   Substance and Sexual Activity    Alcohol use: Yes     Comment: occ     Drug use: No    Sexual activity: Yes     Partners: Male     Birth control/protection: Condom              Review of Systems    Positive for stated complaint: Dizzy while driving.  Other systems are as noted in HPI.  Constitutional and vital signs reviewed.      All other systems reviewed and negative except as noted above.    Physical Exam     ED Triage Vitals [01/17/24 1935]   BP (!) 137/95   Pulse 90   Resp 17   Temp 98.2 °F (36.8 °C)   Temp src Oral   SpO2 100 %   O2 Device None (Room air)       Current:/89   Pulse 75   Temp 98.2 °F (36.8 °C) (Oral)   Resp 16   Ht 170.2 cm (5' 7\")   Wt 63.5 kg   LMP 01/17/2024 (Approximate)   SpO2 98%   BMI 21.93 kg/m²         Physical Exam  Vitals and nursing note reviewed.   Constitutional:       General: She is not in acute distress.     Appearance: She is well-developed. She is not toxic-appearing.   HENT:      Head: Normocephalic and atraumatic.   Eyes:      General: No scleral icterus.     Conjunctiva/sclera: Conjunctivae normal.   Cardiovascular:      Rate and Rhythm: Normal rate.      Heart sounds: Normal heart sounds.   Pulmonary:      Effort: Pulmonary effort is normal. No respiratory distress.      Breath sounds: Normal breath sounds.   Abdominal:      General: There is no distension.   Musculoskeletal:         General: No tenderness. Normal range of motion.      Cervical back: Normal range of motion and neck supple.   Skin:     General: Skin is warm and dry.      Findings: No rash.   Neurological:      Mental Status: She is alert and oriented to person, place, and time.      Cranial Nerves: No cranial nerve deficit.      Motor: No abnormal muscle tone.      Coordination: Coordination normal.   Psychiatric:         Mood and Affect: Mood normal.         Behavior: Behavior normal.              ED Course     Labs Reviewed   COMP METABOLIC PANEL  (14) - Abnormal; Notable for the following components:       Result Value    Glucose 103 (*)     Potassium 3.3 (*)     AST 7 (*)     All other components within normal limits   TROPONIN I HIGH SENSITIVITY - Normal   CBC WITH DIFFERENTIAL WITH PLATELET    Narrative:     The following orders were created for panel order CBC With Differential With Platelet.  Procedure                               Abnormality         Status                     ---------                               -----------         ------                     CBC W/ DIFFERENTIAL[[id number]]                              Final result                 Please view results for these tests on the individual orders.   RAINBOW DRAW LAVENDER   RAINBOW DRAW LIGHT GREEN   RAINBOW DRAW BLUE   CBC W/ DIFFERENTIAL                       MDM           -Pulse oximetry was interpreted by me and was normal.  Pulse oximeter was ordered to monitor patient for hypoxia.        -History source other than patient -partner        -Comorbidities did add complexity to the management are mentioned in the HPI above        -I personally reviewed the prior external notes and the medical record to obtain additional history -I reviewed the chart and patient  January 7, 2020 she presented to the ED for palpitations.  She had a thorough workup in the ED which suggested PVCs.      -DDX: Includes but not limited to -electrolyte disturbance, arrhythmia       Labs Reviewed   COMP METABOLIC PANEL (14) - Abnormal; Notable for the following components:       Result Value    Glucose 103 (*)     Potassium 3.3 (*)     AST 7 (*)     All other components within normal limits   TROPONIN I HIGH SENSITIVITY - Normal   CBC WITH DIFFERENTIAL WITH PLATELET    Narrative:     The following orders were created for panel order CBC With Differential With Platelet.  Procedure                               Abnormality         Status                     ---------                               -----------          ------                     CBC W/ DIFFERENTIAL[[id number]]                              Final result                 Please view results for these tests on the individual orders.   RAINBOW DRAW LAVENDER   RAINBOW DRAW LIGHT GREEN   RAINBOW DRAW BLUE   CBC W/ DIFFERENTIAL           Reviewed.  Potassium 3.3 otherwise CBC CMP troponin all negative.  EKG was also performed rate  69.  Intervals per EKG report.  Normal sinus rhythm normal EKG.      Patient is well-appearing nontoxic.  Vital stable.  No evidence of cardiac monitoring.  Etiology of palpitations unclear at this time.  While there may be some component of anxiety, she should still see cardiology and get evaluated for Holter monitoring.  She wishes to follow-up with her cardiologist at [facility].  I did did also give her [facility] follow-up with she is unable to get in with her own in a timely manner.  But this point time she is well-appearing nontoxic without any signs of distress and her work appears been unremarkable.  Patient does not have any high risk features and is stable for discharge home at this time.  Shared decision making employed patient discharged.  Condition peer                                       Medical Decision Making      Disposition and Plan     Clinical Impression:  1. Palpitations         Disposition:  Discharge  1/17/2024  8:41 pm    Follow-up:  IVORY DAIGLE  [address] [place] [id number] [phone]  Follow up  Return to the ER if you feel worse in any way, Return to the ER if you have any concerns          Medications Prescribed:  Discharge Medication List as of 1/17/2024  8:45 PM

## 2024-04-10 ENCOUNTER — OFFICE VISIT (OUTPATIENT)
Dept: HEMATOLOGY/ONCOLOGY | Age: 34
End: 2024-04-10
Attending: INTERNAL MEDICINE
Payer: COMMERCIAL

## 2024-04-10 VITALS
TEMPERATURE: 97 F | WEIGHT: 143.5 LBS | SYSTOLIC BLOOD PRESSURE: 118 MMHG | HEIGHT: 67 IN | HEART RATE: 71 BPM | RESPIRATION RATE: 18 BRPM | OXYGEN SATURATION: 99 % | BODY MASS INDEX: 22.52 KG/M2 | DIASTOLIC BLOOD PRESSURE: 80 MMHG

## 2024-04-10 DIAGNOSIS — E61.1 IRON DEFICIENCY: Primary | ICD-10-CM

## 2024-04-10 DIAGNOSIS — D68.51 FACTOR V LEIDEN MUTATION (HCC): ICD-10-CM

## 2024-04-10 DIAGNOSIS — E53.8 FOLIC ACID DEFICIENCY: ICD-10-CM

## 2024-04-10 PROCEDURE — 99205 OFFICE O/P NEW HI 60 MIN: CPT | Performed by: INTERNAL MEDICINE

## 2024-04-10 RX ORDER — FOLIC ACID 1 MG/1
1 TABLET ORAL DAILY
Qty: 30 TABLET | Refills: 11 | Status: SHIPPED | OUTPATIENT
Start: 2024-04-10

## 2024-04-10 NOTE — PROGRESS NOTES
Education Record    Learner:  Patient    Disease / Diagnosis: TOBY    Barriers / Limitations:  None   Comments:    Method:  Discussion   Comments:    General Topics:  Plan of care reviewed   Comments:    Outcome:  Shows understanding   Comments:    +fatigue. Heavy period for 2 days. Had some heart palpitations. On pantoprazole for EOE. Recent diagnosis.

## 2024-04-10 NOTE — PROGRESS NOTES
Hematology/Oncology Initial Consultation Note    Patient Name: Tessa Stapleton  Medical Record Number: [id number]    YOB: 1990   Date of Consultation: 4/10/2024   PCP: Daniel Barkley MD    Reason for Consultation:  Tessa Stapleton was seen today for the diagnosis of iron deficiency    History of Present Illness:      35 y/o F PMH heterozygous FVL who presents for evaluation of iron deficiency.    - recent labs showed mild leukopenia with normal differential, normal hemoglobin and platelet count, iron deficiency with ferritin of 6 and folic acid deficiency  - she reports she was recently diagnosed with eosinophilic esophagitis and placed on a PPI  - she notes heavy menstrual bleeding  - she modified her diet; she cut out dairy, no eggs  - FVL mutation was tested because her father had blood clots due to surgery + cancer and is on anticoagulation; had positive FVL mutation testing  - she had 2 kids via vaginal delivery  - having ongoing fatigue    Past Medical History:  Past Medical History:    ALLERGIC RHINITIS    Anxiety    Cyst, thyroid    has appt to see Endocrinologist February 2017    Endometrial hyperplasia    encapsalated placenta    HEADACHES    migraines    Homozygous Factor V Leiden mutation (HCC)    Polyp at cervical os    uterine polyp removal       Past Surgical History:   Procedure Laterality Date    D & c  02/2016    with hysteroscopy, benign uterine polyp    Hysteroscopy  2018    D&C encapsalated placenta    Other surgical history  12/2015    wisdom teeth    Tonsillectomy  1991       Home Medications:   folic acid 1 MG Oral Tab Take 1 tablet (1 mg total) by mouth daily. 30 tablet 11    pantoprazole 40 MG Oral Tab EC Take 1 tablet (40 mg total) by mouth 2 (two) times daily before meals.      sertraline 100 MG Oral Tab Take 1.5 tablets (150 mg total) by mouth.       -------  Current Outpatient Medications on File Prior to Visit   Medication Sig Dispense Refill    pantoprazole 40 MG  Oral Tab EC Take 1 tablet (40 mg total) by mouth 2 (two) times daily before meals.      sertraline 100 MG Oral Tab Take 1.5 tablets (150 mg total) by mouth.       No current facility-administered medications on file prior to visit.       Allergies:   Allergies   Allergen Reactions    Cefdinir RASH    Cefuroxime Axetil RASH    Venlafaxine OTHER (SEE COMMENTS)     shakey    Zithromax HIVES    Cefotetan RASH    Cefuroxime RASH       Psychosocial History:  Social History     Social History Narrative    Not on file     Social History     Socioeconomic History    Marital status:    Tobacco Use    Smoking status: Never    Smokeless tobacco: Never   Vaping Use    Vaping status: Never Used   Substance and Sexual Activity    Alcohol use: Yes     Comment: occ     Drug use: No    Sexual activity: Yes     Partners: Male     Birth control/protection: Condom     Social Determinants of Health      Received from [facility]    Social Connections    Received from [facility]    Housing Stability       Family Medical History:  Family History   Problem Relation Age of Onset    Cancer Maternal Grandfather         liver    Anxiety Maternal Grandfather     Cancer Paternal Grandmother         colon    Diabetes Paternal Grandfather     Heart Disorder Paternal Grandfather     Anxiety Mother     Other (Other) Mother         hypothyroid    Anxiety Maternal Grandmother        Review of Systems:  A 10-point ROS was done with pertinent positives and negative per the HPI    Vital Signs:  Height: 170.2 cm (5' 7\") (04/10 1150)  Weight: 65.1 kg (143 lb 8 oz) (04/10 1150)  BSA (Calculated - sq m): 1.76 sq meters (04/10 1150)  Pulse: 71 (04/10 1150)  BP: 118/80 (04/10 1150)  Temp: 97.3 °F (36.3 °C) (04/10 1150)  Do Not Use - Resp Rate: --  SpO2: 99 % (04/10 1150)    Wt Readings from Last 6 Encounters:   04/10/24 65.1 kg (143 lb 8 oz)   01/17/24 63.5 kg (140 lb)   10/20/22 63.5 kg (140 lb)   06/03/22 63.5 kg  (140 lb)   05/16/22 63.5 kg (140 lb)   03/15/22 63.5 kg (140 lb)       Physical Examination:  General: Patient is alert and oriented, not in acute distress  Psych: Mood and affect are appropriate  Eyes: EOMI, PERRL  ENT: Oropharynx is clear, no adenopathy  CV: no LE edema  Respiratory: Non-labored respirations  GI/Abd: Soft, non-tender, no appreciable hepatosplenomegaly  Neurological: Grossly intact   Lymphatics: No palpable cervical, supraclavicular, axillary, or inguinal lymphadenopathy  Skin: no rashes or petechiae    Laboratory:  Lab Results   Component Value Date    WBC 4.3 01/17/2024    WBC 5.6 01/07/2020    WBC 5.28 08/22/2016    HGB 12.6 01/17/2024    HGB 13.5 01/07/2020    HGB 13.7 08/22/2016    HCT 38.6 01/17/2024    MCV 90.8 01/17/2024    MCH 29.6 01/17/2024    MCHC 32.6 01/17/2024    RDW 12.6 01/17/2024    .0 01/17/2024    .0 01/07/2020     08/22/2016     Lab Results   Component Value Date     (H) 01/17/2024    BUN 10 01/17/2024    BUNCREA 18.8 01/07/2020    CREATSERUM 0.95 01/17/2024    CREATSERUM 0.80 01/07/2020    CREATSERUM 0.79 08/22/2016    ANIONGAP 5 01/17/2024    GFR >60 07/11/2008    GFRNAA 99 01/07/2020    GFRAA 114 01/07/2020    CA 8.9 01/17/2024    OSMOCALC 289 01/17/2024    ALKPHO 65 01/17/2024    AST 7 (L) 01/17/2024    ALT 18 01/17/2024    BILT 0.2 01/17/2024    TP 6.9 01/17/2024    ALB 3.8 01/17/2024    GLOBULIN 3.1 01/17/2024    AGRATIO 2.3 07/11/2008     01/17/2024    K 3.3 (L) 01/17/2024     01/17/2024    CO2 30.0 01/17/2024     No results found for: \"PTT\", \"PT\", \"INR\"    Impression & Plan:     Iron deficiency  - likely secondary to menstrual bleeding  - symptomatic; schedule 750mg IV injectafer, close monitoring in infusion for reaction. Eat 1 small cup of cashews once daily x 1 week following injectafer to prevent hypophosphatemia. Stay off oral iron to prevent exacerbation of EoE  - repeat cbc/iron/tibc/ferritin in 3 months to reassess iron  stores    Folic acid deficiency  - due to inadequate folic acid intake  - start 1mg folic acid daily    Leukopenia  - mild. Normal differential. No intervention needed.    Heterozygous FVL mutation  - she had 2 kids without any episodes of thrombosis. Unlikely to affect her but did discuss increased risk of VTE.    Jerry Bonilla MD  Hematology/Medical Oncology  [facility]

## 2024-04-12 ENCOUNTER — HOSPITAL ENCOUNTER (OUTPATIENT)
Age: 34
Discharge: HOME | End: 2024-04-12
Payer: COMMERCIAL

## 2024-04-12 ENCOUNTER — HOSPITAL ENCOUNTER (EMERGENCY)
Facility: HOSPITAL | Age: 34
Discharge: LEFT BEFORE DISCHARGE | End: 2024-04-12
Payer: COMMERCIAL

## 2024-04-12 VITALS
DIASTOLIC BLOOD PRESSURE: 77 MMHG | BODY MASS INDEX: 21.97 KG/M2 | RESPIRATION RATE: 18 BRPM | WEIGHT: 140 LBS | SYSTOLIC BLOOD PRESSURE: 121 MMHG | HEIGHT: 67 IN | TEMPERATURE: 98 F | HEART RATE: 88 BPM | OXYGEN SATURATION: 100 %

## 2024-04-12 VITALS
WEIGHT: 140 LBS | HEART RATE: 77 BPM | SYSTOLIC BLOOD PRESSURE: 130 MMHG | HEIGHT: 67 IN | TEMPERATURE: 99 F | OXYGEN SATURATION: 98 % | DIASTOLIC BLOOD PRESSURE: 78 MMHG | BODY MASS INDEX: 21.97 KG/M2 | RESPIRATION RATE: 18 BRPM

## 2024-04-12 DIAGNOSIS — M54.16 RIGHT LUMBAR RADICULOPATHY: Primary | ICD-10-CM

## 2024-04-12 LAB
B-HCG UR QL: NEGATIVE
BILIRUB UR QL STRIP.AUTO: NEGATIVE
CLARITY UR REFRACT.AUTO: CLEAR
COLOR UR AUTO: YELLOW
GLUCOSE UR STRIP.AUTO-MCNC: NEGATIVE MG/DL
KETONES UR STRIP.AUTO-MCNC: NEGATIVE MG/DL
LEUKOCYTE ESTERASE UR QL STRIP.AUTO: NEGATIVE
NITRITE UR QL STRIP.AUTO: NEGATIVE
PH UR STRIP.AUTO: 7 [PH] (ref 5–8)
PROT UR STRIP.AUTO-MCNC: NEGATIVE MG/DL
RBC UR QL AUTO: NEGATIVE
SP GR UR STRIP.AUTO: 1.01 (ref 1–1.03)
UROBILINOGEN UR STRIP.AUTO-MCNC: 0.2 MG/DL

## 2024-04-12 PROCEDURE — 81003 URINALYSIS AUTO W/O SCOPE: CPT

## 2024-04-12 PROCEDURE — 81025 URINE PREGNANCY TEST: CPT

## 2024-04-12 PROCEDURE — 99214 OFFICE O/P EST MOD 30 MIN: CPT

## 2024-04-12 PROCEDURE — 96372 THER/PROPH/DIAG INJ SC/IM: CPT

## 2024-04-12 RX ORDER — KETOROLAC TROMETHAMINE 30 MG/ML
30 INJECTION, SOLUTION INTRAMUSCULAR; INTRAVENOUS ONCE
Status: COMPLETED | OUTPATIENT
Start: 2024-04-12 | End: 2024-04-12

## 2024-04-12 RX ORDER — DEXAMETHASONE SODIUM PHOSPHATE 10 MG/ML
10 INJECTION, SOLUTION INTRAMUSCULAR; INTRAVENOUS ONCE
Status: COMPLETED | OUTPATIENT
Start: 2024-04-12 | End: 2024-04-12

## 2024-04-12 RX ORDER — METHYLPREDNISOLONE 4 MG/1
TABLET ORAL
Qty: 1 EACH | Refills: 0 | Status: SHIPPED | OUTPATIENT
Start: 2024-04-14

## 2024-04-12 RX ORDER — CYCLOBENZAPRINE HCL 10 MG
10 TABLET ORAL NIGHTLY
Qty: 10 TABLET | Refills: 0 | Status: SHIPPED | OUTPATIENT
Start: 2024-04-12 | End: 2024-04-22

## 2024-04-12 RX ORDER — DEXAMETHASONE 4 MG/1
12 TABLET ORAL ONCE
Status: DISCONTINUED | OUTPATIENT
Start: 2024-04-12 | End: 2024-04-12

## 2024-04-12 NOTE — ED PROVIDER NOTES
Patient Seen in: [facility]      History     Chief Complaint   Patient presents with    Back Pain     Stated Complaint: Back Pain    Subjective:   HPI    34-year-old female who has known history of axial back pain comes in today complaining of right lower back pain radiating to the buttocks hip and upper thigh.  Patient woke up with the pain yesterday.  Denies any fevers chills or urinary symptoms.  Denies any specific injury or trauma.  Pt has been taking ibuprofen and tylenol OTC to try to help relieve symptoms. Pt denies bowel or bladder incontinence. No severe pain, weakness, or lower extremity swelling. Pt feeling well otherwise. No urinary sx, GI sx, or URI sx at this time.     Red Flags of Back Pain Reviewed:   History of cancer: no   Pain not relieved by rest: no   HIV: denies   Unexplained weight loss: no   History of chronic infections or fever: no   Presence of worsening night time pain: no   Perianal anesthesia: denies   Bilateral sciatica/ Bilateral leg weakness: denies   Overflow incontinence: denies    Objective:   Past Medical History:    ALLERGIC RHINITIS    Anxiety    Cyst, thyroid    has appt to see Endocrinologist February 2017    Endometrial hyperplasia    encapsalated placenta    HEADACHES    migraines    Homozygous Factor V Leiden mutation (HCC)    Polyp at cervical os    uterine polyp removal              Past Surgical History:   Procedure Laterality Date    D & c  02/2016    with hysteroscopy, benign uterine polyp    Hysteroscopy  2018    D&C encapsalated placenta    Other surgical history  12/2015    wisdom teeth    Tonsillectomy  1991                Social History     Socioeconomic History    Marital status:    Tobacco Use    Smoking status: Never    Smokeless tobacco: Never   Vaping Use    Vaping status: Never Used   Substance and Sexual Activity    Alcohol use: Yes     Comment: occ     Drug use: No    Sexual activity: Yes     Partners: Male     Birth  control/protection: Condom     Social Determinants of Health      [facility]    Social Connections    Received from [facility]    Housing Stability              Review of Systems    Positive for stated complaint: Back Pain  Other systems are as noted in HPI.  Constitutional and vital signs reviewed.      All other systems reviewed and negative except as noted above.    Physical Exam     ED Triage Vitals [04/12/24 0945]   /78   Pulse 77   Resp 18   Temp 98.5 °F (36.9 °C)   Temp src Temporal   SpO2 98 %   O2 Device None (Room air)       Current:/78   Pulse 77   Temp 98.5 °F (36.9 °C) (Temporal)   Resp 18   Ht 170.2 cm (5' 7\")   Wt 63.5 kg   LMP 03/29/2024 (Approximate)   SpO2 98%   Breastfeeding No   BMI 21.93 kg/m²         Physical Exam    General Appearance:  Alert, cooperative, no distress, appropriate for age  Head:  Normocephalic, without obvious abnormality  Neck:  Supple; symmetrical, trachea midline, no adenopathy  Lungs:  Clear to auscultation bilaterally, respirations unlabored   Heart:  Regular rate & rhythm, S1 and S2 normal, no murmurs, rubs, or gallops  Abdomen:  Soft, non-tender, bowel sounds active all four quadrants, no mass or organomegaly. No rebound tenderness or guarding  Lumbar:  Limited ROM secondary to pain, no bony ttp, + ttp over lumbar paraspinal muscles, negative SLR Bilaterally, 5/5 bilateral lower extremity motor strength exam, SILT, + 2/4 bilateral patella and achilles reflexes                Lymphatic:  No adenopathy  Skin/Hair/Nails:  Skin warm, dry and intact, no rashes or abnormal dyspigmentation  Neurologic:  Alert and oriented x3,  normal strength and tone, gait steady      ED Course   Labs Reviewed - No data to display         MDM             Medical Decision Making  34-year-old female with right-sided back pain radiating down the right lower  extremity that started yesterday.  Patient has a history of back problems.  Patient denies fever and chills.  Denies bowel or bladder incontinence    Problems Addressed:  Right lumbar radiculopathy: acute illness or injury    Amount and/or Complexity of Data Reviewed  ECG/medicine tests: ordered and independent interpretation performed. Decision-making details documented in ED Course.     Details: Toradol and Decadron IM     Risk  OTC drugs.  Prescription drug management.  Risk Details: Clinical Impression: Lumbar muscular strain      The differential diagnosis before testing included sciatica, cauda equina, lumbar muscular strain, lumbar compression fracture which is a medical condition that poses a threat to life/function.     Discussed with the patient ice and moist heat to the area, we discussed anti-inflammatory medications and muscle relaxants; these medications can make you drowsy do not use these medications if you will be working or driving.  Discussed with the patient when to return to the emergency room including worsening pain, bowel and bladder changes or numbness and tingling to the perineal region    Flexeril and Medrol dose pack prescribed             Disposition and Plan     Clinical Impression:  1. Right lumbar radiculopathy         Disposition:  Discharge  4/12/2024 10:34 am    Follow-up:  Daniel Barkley MD  [id number]  [facility] [address]  [phone]          Radha Stern, CHELSEY  [address] [address]  [phone]    Schedule an appointment as soon as possible for a visit in 2 days  If symptoms worsen          Medications Prescribed:  Discharge Medication List as of 4/12/2024 10:54 AM        START taking these medications    Details   methylPREDNISolone (MEDROL) 4 MG Oral Tablet Therapy Pack Dosepack: take as directed, Normal, Disp-1 each, R-0      cyclobenzaprine 10 MG Oral Tab Take 1 tablet (10 mg total) by mouth nightly for 10 days., Normal, Disp-10  tablet, [employer]               This report has been produced using speech recognition software and may contain errors related to that system including, but not limited to, errors in grammar, punctuation, and spelling, as well as words and phrases that possibly may have been recognized inappropriately.  If there are any questions or concerns, contact the dictating provider for clarification.     NOTE: The 21st Century Cares Act makes medical notes available to patients.  Be advised that this is a medical document written in medical language and may contain abbreviations or verbiage that is unfamiliar or direct.  It is primarily intended to carry relevant historical information, physical exam findings, and the clinical assessment of the physician.

## 2024-04-12 NOTE — ED INITIAL ASSESSMENT (HPI)
R LOWER BACK PAIN, radiating to R buttocks and hip - woke up with the pain yesterday; no fever, dysuria, injury    Bulging discs L4-L5, hx sciatica

## 2024-04-12 NOTE — DISCHARGE INSTRUCTIONS
Start the medrol dose pack in 48 hours if still persistent.  No ibuprofen, Advil, Motrin, Aleve, or Aspirin while on Medrol dose pack. Take the Flexeril at bedtime as needed for muscle spasmDo not operate machinery or drive or consume alcohol while taking this medication. You may use ice or moist heat 10-15 minutes several times a day for comfort.  Ice through clothing or a towel to avoid frostbite. Do not sleep with a heating pad as this will make the spasm worse.  Change positions frequently throughout the day.  Avoid heavy lifting. Once your pain has improved, start stretching exercises. Go to the closest Emergency Department if you develop uncontrolled pain, spontaneous loss of urine or stool, numbness or weakness into your arms or legs. See your doctor in 3-5 days if not better.

## 2024-04-13 NOTE — ED INITIAL ASSESSMENT (HPI)
Pt to ER ambulatory with steady gait. Pt presents with severe right lower back/flank pain. Symptoms started yesterday morning.   Denies urinary symptoms, denies fevers.   Pt went to immediate care this morning, given steroid/pain medication but pain is getting worse, now getting a headache.

## 2024-04-15 ENCOUNTER — OFFICE VISIT (OUTPATIENT)
Dept: HEMATOLOGY/ONCOLOGY | Age: 34
End: 2024-04-15
Attending: INTERNAL MEDICINE
Payer: COMMERCIAL

## 2024-04-15 VITALS
TEMPERATURE: 99 F | SYSTOLIC BLOOD PRESSURE: 99 MMHG | HEART RATE: 74 BPM | OXYGEN SATURATION: 100 % | DIASTOLIC BLOOD PRESSURE: 64 MMHG | RESPIRATION RATE: 18 BRPM

## 2024-04-15 DIAGNOSIS — E61.1 IRON DEFICIENCY: Primary | ICD-10-CM

## 2024-04-15 PROCEDURE — 96374 THER/PROPH/DIAG INJ IV PUSH: CPT

## 2024-04-15 NOTE — PROGRESS NOTES
Education Record    Learner:  Patient    Disease / Diagnosis: patient  here for injectafer infusion     Barriers / Limitations:  None    Method:  Brief focused, printed material and  reinforcement    General Topics:  Plan of care reviewed    Outcome:  Shows understanding   Patient  tolerated infusion, no c/o. Dc'd home in stable condition.

## 2024-04-16 ENCOUNTER — TELEPHONE (OUTPATIENT)
Dept: HEMATOLOGY/ONCOLOGY | Facility: HOSPITAL | Age: 34
End: 2024-04-16

## 2024-04-16 NOTE — TELEPHONE ENCOUNTER
Toxicities: Injectafer infusion on 4/15/2024    Diarrhea    Tessa reports she had one liquid, brown stool today. She said the infusion nurse said the iron infusion can cause diarrhea. Tessa reports she has been moving her bowels daily prior to treatment. She wanted to know if there is anything she should do?    I recommended she drink plenty of caffeine free fluids. If she has 3 or more liquid stools to take a dose of loperamide. I also suggested she avoid any spicy, greasy, deep fried foods, high fiber foods and caffienated drinks today because it may make the diarrhea worse. She agreed with the plan. No other questions or concerns at this time.

## 2024-04-24 ENCOUNTER — E-ADVICE (OUTPATIENT)
Dept: ALLERGY | Age: 34
End: 2024-04-24

## 2024-04-30 ASSESSMENT — ENCOUNTER SYMPTOMS
ADENOPATHY: 0
FEVER: 0
CHEST TIGHTNESS: 0
SLEEP DISTURBANCE: 0
FACIAL SWELLING: 0
WHEEZING: 0
ABDOMINAL PAIN: 0
APPETITE CHANGE: 0
HEADACHES: 0
NERVOUS/ANXIOUS: 0
SORE THROAT: 0

## 2024-05-01 ENCOUNTER — APPOINTMENT (OUTPATIENT)
Dept: ALLERGY | Age: 34
End: 2024-05-01

## 2024-05-01 DIAGNOSIS — R13.10 DYSPHAGIA, UNSPECIFIED TYPE: ICD-10-CM

## 2024-05-01 DIAGNOSIS — K20.0 EE (EOSINOPHILIC ESOPHAGITIS): Primary | ICD-10-CM

## 2024-05-01 DIAGNOSIS — Z88.1 ALLERGY TO MULTIPLE ANTIBIOTICS: ICD-10-CM

## 2024-05-01 DIAGNOSIS — J30.9 ALLERGIC RHINOCONJUNCTIVITIS: ICD-10-CM

## 2024-05-01 DIAGNOSIS — H10.10 ALLERGIC RHINOCONJUNCTIVITIS: ICD-10-CM

## 2024-05-01 PROCEDURE — G2211 COMPLEX E/M VISIT ADD ON: HCPCS | Performed by: ALLERGY & IMMUNOLOGY

## 2024-05-01 PROCEDURE — 99205 OFFICE O/P NEW HI 60 MIN: CPT | Performed by: ALLERGY & IMMUNOLOGY

## 2024-05-01 RX ORDER — AZELASTINE HYDROCHLORIDE 0.5 MG/ML
1 SOLUTION/ DROPS OPHTHALMIC 2 TIMES DAILY PRN
Qty: 1 EACH | Refills: 3 | Status: SHIPPED | OUTPATIENT
Start: 2024-05-01

## 2024-05-01 ASSESSMENT — ENCOUNTER SYMPTOMS
VOMITING: 1
EYE ITCHING: 1
DIARRHEA: 1
RHINORRHEA: 1

## 2024-05-31 ASSESSMENT — ENCOUNTER SYMPTOMS
NAUSEA: 0
ABDOMINAL PAIN: 0
DIARRHEA: 0
SHORTNESS OF BREATH: 0
COUGH: 0
WHEEZING: 0
FATIGUE: 0
RHINORRHEA: 0
VOMITING: 0
CHILLS: 0
CHEST TIGHTNESS: 0
FEVER: 0

## 2024-06-03 ENCOUNTER — APPOINTMENT (OUTPATIENT)
Dept: ALLERGY | Age: 34
End: 2024-06-03

## 2024-06-03 ENCOUNTER — E-ADVICE (OUTPATIENT)
Dept: OTHER | Age: 34
End: 2024-06-03

## 2024-06-03 ENCOUNTER — E-ADVICE (OUTPATIENT)
Dept: ALLERGY | Age: 34
End: 2024-06-03

## 2024-06-03 DIAGNOSIS — J30.9 ALLERGIC RHINOCONJUNCTIVITIS: Primary | ICD-10-CM

## 2024-06-03 DIAGNOSIS — Z88.1 ALLERGY TO MULTIPLE ANTIBIOTICS: ICD-10-CM

## 2024-06-03 DIAGNOSIS — K20.0 EE (EOSINOPHILIC ESOPHAGITIS): ICD-10-CM

## 2024-06-03 DIAGNOSIS — H10.10 ALLERGIC RHINOCONJUNCTIVITIS: Primary | ICD-10-CM

## 2024-06-03 PROCEDURE — G2211 COMPLEX E/M VISIT ADD ON: HCPCS | Performed by: ALLERGY & IMMUNOLOGY

## 2024-06-03 PROCEDURE — 99215 OFFICE O/P EST HI 40 MIN: CPT | Performed by: ALLERGY & IMMUNOLOGY

## 2024-06-05 ENCOUNTER — LAB SERVICES (OUTPATIENT)
Dept: FAMILY MEDICINE | Age: 34
End: 2024-06-05

## 2024-06-05 DIAGNOSIS — J30.9 ALLERGIC RHINOCONJUNCTIVITIS: ICD-10-CM

## 2024-06-05 DIAGNOSIS — K20.0 EE (EOSINOPHILIC ESOPHAGITIS): ICD-10-CM

## 2024-06-05 DIAGNOSIS — H10.10 ALLERGIC RHINOCONJUNCTIVITIS: ICD-10-CM

## 2024-06-05 LAB — IGE SERPL-ACNC: <3.6 IUNITS/ML

## 2024-06-05 PROCEDURE — 82785 ASSAY OF IGE: CPT | Performed by: CLINICAL MEDICAL LABORATORY

## 2024-06-06 LAB
ALLERGEN:  CHICKEN, IGE - SEQUOIA: <0.1 KU/L
ALLERGEN:  CLADOSPORIUM HERBARUM (HORMODENDRUM), IGE - SEQUOIA: <0.1 KU/L
ALLERGEN:  CLAM, IGE - SEQUOIA: <0.1 KU/L
ALLERGEN:  COCKROACH (GERMAN), IGE - SEQUOIA: <0.1 KU/L
ALLERGEN:  COCKSFOOT GRASS (ORCHARD), IGE - SEQUOIA: <0.1 KU/L
ALLERGEN:  COD FISH, IGE - SEQUOIA: <0.1 KU/L
ALLERGEN:  CORN (MAIZE), IGE - SEQUOIA: <0.1 KU/L
ALLERGEN:  CRAB, IGE - SEQUOIA: <0.1 KU/L
ALLERGEN:  D. FARINAE, IGE - SEQUOIA: <0.1 KU/L
ALLERGEN:  D. PTERONYSSINUS, IGE - SEQUOIA: <0.1 KU/L
ALLERGEN:  DOG DANDER, IGE - SEQUOIA: <0.1 KU/L
ALLERGEN:  EGG WHITE, IGE - SEQUOIA: <0.1 KU/L
ALLERGEN:  EGG YOLK, IGE - SEQUOIA: <0.1 KU/L
ALLERGEN:  MILK, COW, IGE - SEQUOIA: <0.1 KU/L
ALLERGEN: ALMOND, IGE - SEQUOIA: <0.1 KU/L
ALLERGEN: ALTERNARIA TENUIS, IGE - SEQUOIA: <0.1 KU/L
ALLERGEN: APPLE, IGE - SEQUOIA: <0.1 KU/L
ALLERGEN: ASPERGILLUS FUMIGATUS, IGE - SEQUOIA: <0.1 KU/L
ALLERGEN: ASPERGILLUS NIGER, IGE - SEQUOIA: <0.1 KU/L
ALLERGEN: AUREOBASIDIUM PULLULANS, IGE - SEQUOIA: <0.1 KU/L
ALLERGEN: BANANA, IGE - SEQUOIA: <0.1 KU/L
ALLERGEN: BEECH TREE, IGE - SEQUOIA: <0.1 KU/L
ALLERGEN: BEEF, IGE - SEQUOIA: <0.1 KU/L
ALLERGEN: BERMUDA GRASS, IGE - SEQUOIA: <0.1 KU/L
ALLERGEN: BIRCH TREE, IGE - SEQUOIA: <0.1 KU/L
ALLERGEN: BOX ELDER TREE, IGE - SEQUOIA: <0.1 KU/L
ALLERGEN: BRAZIL NUT, IGE - SEQUOIA: <0.1 KU/L
ALLERGEN: CARROT, IGE - SEQUOIA: <0.1 KU/L
ALLERGEN: CASHEW, IGE - SEQUOIA: <0.1 KU/L
ALLERGEN: CAT DANDER, IGE - SEQUOIA: <0.1 KU/L
ALLERGEN: CEDAR OR RED CEDAR TREE - SEQUOIA: <0.1 KU/L
ALLERGEN: ELM TREE, IGE - SEQUOIA: <0.1 KU/L
ALLERGEN: ENGLISH PLANTAIN, IGE - SEQUOIA: <0.1 KU/L
ALLERGEN: EPICOCCUM PURPURASCENS, IGE - SEQUOIA: <0.1 KU/L
ALLERGEN: FUSARIUM PROLIFERATUM, IGE - SEQUOIA: <0.1 KU/L
ALLERGEN: HAZELNUT, IGE - SEQUOIA: <0.1 KU/L
ALLERGEN: HICKORY AND PECAN TREE, IGE - SEQUOIA: <0.1 KU/L
ALLERGEN: JACK MACKERAL, IGE - SEQUOIA: <0.1 KU/L
ALLERGEN: JOHNSON GRASS, IGE - SEQUOIA: <0.1 KU/L
ALLERGEN: LOBSTER, IGE - SEQUOIA: <0.1 KU/L
ALLERGEN: MACADAMIA NUT, IGE - SEQUOIA: <0.1 KU/L
ALLERGEN: MEADOW GRASS (KENTUCKY), IGE - SEQUOIA: <0.1 KU/L
ALLERGEN: MUCOR RACEMOSUS, IGE - SEQUOIA: <0.1 KU/L
ALLERGEN: MUGWORT, IGE - SEQUOIA: <0.1 KU/L
ALLERGEN: OAK TREE, IGE - SEQUOIA: <0.1 KU/L
ALLERGEN: OYSTER, IGE - SEQUOIA: <0.1 KU/L
ALLERGEN: PEANUT, IGE - SEQUOIA: <0.1 KU/L
ALLERGEN: PECAN NUT, IGE - SEQUOIA: <0.1 KU/L
ALLERGEN: PENICILLIUM CHRYSOGENUM, IGE - SEQUOIA: <0.1 KU/L
ALLERGEN: PHOMA BETAE, IGE - SEQUOIA: <0.1 KU/L
ALLERGEN: PINE NUT, IGE - SEQUOIA: <0.1 KU/L
ALLERGEN: PISTACHIO, IGE - SEQUOIA: <0.1 KU/L
ALLERGEN: PORK, IGE - SEQUOIA: <0.1 KU/L
ALLERGEN: RAGWEED, GIANT, IGE - SEQUOIA: <0.1 KU/L
ALLERGEN: REDTOP GRASS, IGE - SEQUOIA: <0.1 KU/L
ALLERGEN: RICE, IGE - SEQUOIA: <0.1 KU/L
ALLERGEN: RUSSIAN THISTLE, IGE - SEQUOIA: <0.1 KU/L
ALLERGEN: RYE GRASS, IGE - SEQUOIA: <0.1 KU/L
ALLERGEN: SALMON, IGE - SEQUOIA: <0.1 KU/L
ALLERGEN: SCALLOP, IGE - SEQUOIA: <0.1 KU/L
ALLERGEN: SETOMELANOMMA ROSTRATA, IGE - SEQUOIA: <0.1 KU/L
ALLERGEN: SHORT RAGWEED, IGE - SEQUOIA: <0.1 KU/L
ALLERGEN: SHRIMP, IGE - SEQUOIA: <0.1 KU/L
ALLERGEN: SOYBEAN, IGE - SEQUOIA: <0.1 KU/L
ALLERGEN: SYCAMORE TREE, IGE - SEQUOIA: <0.1 KU/L
ALLERGEN: TIMOTHY GRASS, IGE - SEQUOIA: <0.1 KU/L
ALLERGEN: TUNA, IGE - SEQUOIA: <0.1 KU/L
ALLERGEN: WALNUT TREE, IGE - SEQUOIA: <0.1 KU/L
ALLERGEN: WALNUT, IGE - SEQUOIA: <0.1 KU/L
ALLERGEN: WHEAT, IGE - SEQUOIA: <0.1 KU/L
ALLERGEN: WHITE ASH TREE, IGE - SEQUOIA: <0.1 KU/L
ALLERGEN: WILLOW TREE, IGE - SEQUOIA: <0.1 KU/L

## 2024-07-17 ENCOUNTER — NURSE ONLY (OUTPATIENT)
Dept: HEMATOLOGY/ONCOLOGY | Age: 34
End: 2024-07-17
Attending: INTERNAL MEDICINE
Payer: COMMERCIAL

## 2024-07-17 DIAGNOSIS — E61.1 IRON DEFICIENCY: ICD-10-CM

## 2024-07-17 LAB
BASOPHILS # BLD AUTO: 0.07 X10(3) UL (ref 0–0.2)
BASOPHILS NFR BLD AUTO: 1.8 %
DEPRECATED HBV CORE AB SER IA-ACNC: 100 NG/ML
EOSINOPHIL # BLD AUTO: 0.24 X10(3) UL (ref 0–0.7)
EOSINOPHIL NFR BLD AUTO: 6 %
ERYTHROCYTE [DISTWIDTH] IN BLOOD BY AUTOMATED COUNT: 12.9 %
HCT VFR BLD AUTO: 40.9 %
HGB BLD-MCNC: 13.6 G/DL
IMM GRANULOCYTES # BLD AUTO: 0.02 X10(3) UL (ref 0–1)
IMM GRANULOCYTES NFR BLD: 0.5 %
IRON SATN MFR SERPL: 31 %
IRON SERPL-MCNC: 93 UG/DL
LYMPHOCYTES # BLD AUTO: 1.05 X10(3) UL (ref 1–4)
LYMPHOCYTES NFR BLD AUTO: 26.4 %
MCH RBC QN AUTO: 31.8 PG (ref 26–34)
MCHC RBC AUTO-ENTMCNC: 33.3 G/DL (ref 31–37)
MCV RBC AUTO: 95.6 FL
MONOCYTES # BLD AUTO: 0.32 X10(3) UL (ref 0.1–1)
MONOCYTES NFR BLD AUTO: 8.1 %
NEUTROPHILS # BLD AUTO: 2.27 X10 (3) UL (ref 1.5–7.7)
NEUTROPHILS # BLD AUTO: 2.27 X10(3) UL (ref 1.5–7.7)
NEUTROPHILS NFR BLD AUTO: 57.2 %
PLATELET # BLD AUTO: 149 10(3)UL (ref 150–450)
RBC # BLD AUTO: 4.28 X10(6)UL
TOTAL IRON BINDING CAPACITY: 299 UG/DL (ref 250–425)
TRANSFERRIN SERPL-MCNC: 182 MG/DL (ref 250–380)
WBC # BLD AUTO: 4 X10(3) UL (ref 4–11)

## 2024-07-17 PROCEDURE — 83550 IRON BINDING TEST: CPT

## 2024-07-17 PROCEDURE — 36415 COLL VENOUS BLD VENIPUNCTURE: CPT

## 2024-07-17 PROCEDURE — 83540 ASSAY OF IRON: CPT

## 2024-07-17 PROCEDURE — 85025 COMPLETE CBC W/AUTO DIFF WBC: CPT

## 2024-07-17 PROCEDURE — 82728 ASSAY OF FERRITIN: CPT

## 2024-08-06 ENCOUNTER — OFFICE VISIT (OUTPATIENT)
Dept: RHEUMATOLOGY | Facility: CLINIC | Age: 34
End: 2024-08-06
Payer: COMMERCIAL

## 2024-08-06 VITALS
SYSTOLIC BLOOD PRESSURE: 100 MMHG | HEART RATE: 81 BPM | RESPIRATION RATE: 16 BRPM | DIASTOLIC BLOOD PRESSURE: 76 MMHG | WEIGHT: 140 LBS | BODY MASS INDEX: 21.97 KG/M2 | HEIGHT: 67 IN | TEMPERATURE: 98 F | OXYGEN SATURATION: 99 %

## 2024-08-06 DIAGNOSIS — D68.51 HOMOZYGOUS FACTOR V LEIDEN MUTATION (HCC): Primary | ICD-10-CM

## 2024-08-06 DIAGNOSIS — M79.7 FIBROMYALGIA: ICD-10-CM

## 2024-08-06 DIAGNOSIS — I73.00 RAYNAUD'S DISEASE WITHOUT GANGRENE: ICD-10-CM

## 2024-08-06 PROCEDURE — 3078F DIAST BP <80 MM HG: CPT | Performed by: INTERNAL MEDICINE

## 2024-08-06 PROCEDURE — 3008F BODY MASS INDEX DOCD: CPT | Performed by: INTERNAL MEDICINE

## 2024-08-06 PROCEDURE — 3074F SYST BP LT 130 MM HG: CPT | Performed by: INTERNAL MEDICINE

## 2024-08-06 PROCEDURE — 99205 OFFICE O/P NEW HI 60 MIN: CPT | Performed by: INTERNAL MEDICINE

## 2024-08-06 RX ORDER — GABAPENTIN 100 MG/1
300 CAPSULE ORAL NIGHTLY
Qty: 90 CAPSULE | Refills: 2 | Status: SHIPPED | OUTPATIENT
Start: 2024-08-06

## 2024-08-06 NOTE — PROGRESS NOTES
[facility], [address] [place] [address]      Consult     Tessa Stapleton Patient Status:  No patient class for patient encounter    1990 MRN [id number]   Location [facility], [address] [place] [address] Attending No att. providers found   Hosp Day # 0 PCP Daniel Barklye MD     Referring Provider: PCP    Reason for Consultation: Joint pain      Subjective:    Tessa Stapleton is a 34 year old female with comes in for further evaluation of joint pain and arthralgias.    Has been told she may have hypermobility syndrome.     She had extreme body pain and spasm and went to [facility] in 2016 (arthragias, shaking; rolling eyes, fatigue) (no kids) at that time.     Went there for for workup and saw functional doctor at that time.     All the testing was okay. Ended up helping was zoloft and helped symptoms but never officially diagnosed with anything    Over the years hormone testing was unrevealing (mentrual periods) PMS (estrogen dominant) had uternine polyp (before or after hospital evaluation)    NO functional doctor seen again. Still having menstrual periods (regularly) some clots; some changes     Had kids in 2018. Pain has worsened     Has on and off pain in her shoulders, elbows , wrists or hands ; knee pain; low back; hip pain    +neck (stiffness, soreness; ashiness; no burning or shooting     No swelling of joints; standing and temperature (changes) stays for a while; eventually goes away    Swelling in pregnancy lower legs (stockings) and has varicose veins and spider veins.     Denies any history of DVT PE TIA CVA seizures +migraines + headaches (happens 1-2 times a month)    Lay down sleep and NSAIDS (takes care of it for most part)    Esophageal strictures () (12 years) problems swallowing food;     Since dilations improved (last surgery May 2023) Granular cell tumor removed (stent) placed     Landed in hospital for 4 days (may 2024) stent taken out;  endoscopy (pending) ; barium study was okay (no perforation) no dysmolity; no major issues; has some reflux? Has history of hiatal hernia    On elimination diet; abdominal pain constipation (worse) chronic on and off (since 2018) before was diarrhea (no colonscopy) IBS? NO blood in stools     States no shortness of breath ,chest pain ,fevers ,chills (march 2023) K was low (palpiations) around 3.3 Jan 17th 2024    States no urinary +bowel symptoms; NO bloody stools    + jaw pain (grinding teeth) on mouth guards; saw ENT as well for congestion (allergy testing) negative,   No vision changes    States no history of pericardial, pleural effusions    States no significant dry eyes or dry mouth (occasional dry eyes) not every day drops     Denitist appt was normal     States no history of uveitis iritis scleritis    Has some Raynaud's NO digital ulcerations    States no major weight changes; night sweats    Her weight has been stable    Wt Readings from Last 6 Encounters:   08/06/24 140 lb (63.5 kg)   04/12/24 140 lb (63.5 kg)   04/10/24 143 lb 8 oz (65.1 kg)   01/17/24 140 lb (63.5 kg)   10/20/22 140 lb (63.5 kg)   06/03/22 140 lb (63.5 kg)         States no history of photosensitive or malar rash.    States no history of psoriasis    History depression anxiety (working)  or insomnia (fatigue); nonrestortive sleep; last sleep study (sleep apnea) no repeat testing; no cpap     Does not excercize;     Has seen ortho and pain management (saw that doctor) MRI in past and epidural SI jiont on right a month ago with some improvement.     Knee injection in May 2024      IMPRESSION:    1. No MRI evidence of meniscal tears, as questioned.    2. Patellar chondromalacia, grossly stable.    3. 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  Narrative        History/Other:      Past Medical  History:  Past Medical History:    ALLERGIC RHINITIS    Anxiety    Cyst, thyroid    has appt to see Endocrinologist February 2017    Endometrial hyperplasia    encapsalated placenta    HEADACHES    migraines    Homozygous Factor V Leiden mutation (HCC)    Polyp at cervical os    uterine polyp removal        Past Surgical History:   Past Surgical History:   Procedure Laterality Date    D & c  02/2016    with hysteroscopy, benign uterine polyp    Hysteroscopy  2018    D&C encapsalated placenta    Other surgical history  12/2015    wisdom teeth    Tonsillectomy  1991       Social History:  reports that she has never smoked. She has never used smokeless tobacco. She reports that she does not currently use alcohol. She reports that she does not use drugs.    Family History:   Family History   Problem Relation Age of Onset    Cancer Maternal Grandfather         liver    Anxiety Maternal Grandfather     Cancer Paternal Grandmother         colon    Diabetes Paternal Grandfather     Heart Disorder Paternal Grandfather     Anxiety Mother     Other (Other) Mother         hypothyroid    Anxiety Maternal Grandmother        Allergies:   Allergies   Allergen Reactions    Cefdinir RASH    Cefuroxime Axetil RASH    Venlafaxine OTHER (SEE COMMENTS)     shakey    Zithromax HIVES    Cefotetan RASH    Cefuroxime RASH       Current Medications:  Current Outpatient Medications   Medication Sig Dispense Refill    gabapentin 100 MG Oral Cap Take 3 capsules (300 mg total) by mouth nightly. 100mg at bedtime x1 week then 200mg at bedtime x 1 week then 300mg at bedtime 90 capsule 2    folic acid 1 MG Oral Tab Take 1 tablet (1 mg total) by mouth daily. 30 tablet 11    pantoprazole 40 MG Oral Tab EC Take 1 tablet (40 mg total) by mouth 2 (two) times daily before meals.      sertraline 100 MG Oral Tab Take 1.5 tablets (150 mg total) by mouth.        No current facility-administered medications for this visit.     (Not in a hospital  admission)      Review of Systems:     Constitutional: Negative for chills, , +fatigue, fever and unexpected weight change.    HENT: Negative for congestion, and mouth sores.    Eyes: Negative for photophobia, pain, redness and visual disturbance.    Respiratory: Negative for apnea, cough, chest tightness, shortness of breath, wheezing and stridor.    Cardiovascular: Negative for chest pain, palpitations and leg swelling.    Gastrointestinal: + abdominal distention, periods of abdominal pain, NO blood in stool, alternating constipation, diarrhea and nausea.    Endocrine: Negative.     Genitourinary: Negative for decreased urine volume, difficulty urinating, dyspareunia, dysuria, flank pain, and frequency.    Musculoskeletal: + arthralgias, no gait problem and joint swelling.    Skin: Negative for color change, pallor and rash. + raynauds NO digital ulcerations no sclerodactly.    Allergic/Immunologic: Negative.    Neurological: Negative for dizziness, tremors, seizures, syncope, speech difficulty, weakness, light-headedness, occasional numbness and headaches.    Hematological: Does not bruise/bleed easily.    Psychiatric/Behavioral: Negative for confusion, decreased concentration, hallucinations, self-injury, +sleep disturbance and no suicidal ideas or depression.    Objective:   Vitals:    08/06/24 1031   BP: 100/76   Pulse: 81   Resp: 16   Temp: 98 °F (36.7 °C)          Constitutional: is oriented to person, place, and time. Appears well-developed and well-nourished. No distress.    HEENT: Normocephalic; EOMI; no jvd; no LAD; no oral or nasal ulcers.     Eyes: Conjunctivae and EOM are normal. Pupils are equal, round, and reactive to light.     Neck: Normal range of motion. No thyromegaly present.    Cardiovascular: RRR, no murmurs.    Lungs: Clear, Bilateral air entry, no wheezes.    Abdominal: Soft.    Musculoskeletal:         Joint Exam 08/06/2024        Right  Left   TMJ   Tender   Tender   Sternoclavicular    Tender   Tender   Acromioclavicular   Tender   Tender   Glenohumeral   Tender   Tender   Elbow   Tender   Tender   Wrist   Tender   Tender   Cervical Spine   Tender      Thoracic Spine   Tender      Lumbar Spine   Tender      Sacroiliac   Tender   Tender   Hip   Tender   Tender   Knee   Tender   Tender   Ankle   Tender   Tender   Subtalar   Tender   Tender        Swollen: 0      Tender: 25          Right shoulder: Exhibits normal range of motion on abduction and internal rotation, no tenderness, no bony tenderness, no deformity, no laceration, no pain and no spasm.        Left shoulder: Exhibits normal range of motion on abduction and internal and external rotation.  no tenderness, no bony tenderness, no swelling, no effusion, no deformity, no pain, no spasm and normal strength.        Right elbow:  Exhibits normal range of motion, no swelling, no effusion and no deformity. No tenderness found. No medial epicondyle, no lateral epicondyle and no olecranon process tenderness noted. There are no contractures or tophi or nodules.        Left elbow:  Normal range of motion, no swelling, no effusion and no deformity. No medial epicondyle, no lateral epicondyle and no olecranon process tenderness noted. There are no contractures or tophi or nodules.        Right wrist:  Exhibits normal range of motion, no tenderness, no bony tenderness, no swelling, no effusion and no crepitus. Flexion and extension intact w/o limitation.        Left wrist: Exhibits normal range of motion, no tenderness, no bony tenderness, no swelling, no effusion, no crepitus and no deformity. Flexion and extension intact without limitation.        Right hip: Exhibits normal range of motion, normal strength, no tenderness, no bony tenderness, no swelling and no crepitus.        Right hand: No synovitis of MCP,PIP or DIP joints; no Bouchards or Heberden nodules noted;  strength: 100%.        Left hand: No synovitis of MCP,PIP or DIP joints; no  Bouchards or Heberden nodules noted;  strength: 100%.        Left hip: Exhibits normal range of motion, normal strength, no tenderness, no bony tenderness, No swelling and no crepitus.        Right knee: Exhibits normal range of motion, no swelling, no effusion, no ecchymosis, no deformity and no erythema. No tenderness found. No medial joint line, no lateral joint line, no MCL and no LCL tenderness noted. No crepitation on flexion of knee and extension normal.        Left knee:  Exhibits normal range of motion, no swelling, no effusion, no ecchymosis and no erythema. No tenderness found. No medial joint line, no lateral joint line and no patellar tendon tenderness noted. No crepitation on flexion of the knee. Extension intact and normal.        Right ankle: No swelling, no deformity. No tenderness. Dorsiflexion and plantar flexion intact without limitation in range of motion.        Left ankle: Exhibits no swelling. No tenderness. No lateral malleolus and no medial malleolus tenderness found. Achilles tendon normal. Achilles tendon exhibits no pain, no defect and normal Noonan's test results.  Dorsiflexion and plantar flexion intact without limitation in range of motion.        Cervical back: Exhibits normal range of motion, no tenderness, no bony tenderness, no swelling, no pain and + spasm.        Thoracic back: Exhibits normal range of motion, no tenderness, no bony tenderness and+ spasm.        Lumbar back:  Exhibits normal range of motion, no tenderness, no bony tenderness, no pain and no spasm.        Right foot: normal. There is normal range of motion, no tenderness, no bony tenderness, no crepitus and no laceration. There is no synovitis or tenderness of the MTP joints to palpation.        Left foot: normal. There is normal range of motion, no tenderness, no bony tenderness and no crepitus. There is no synovitis or tenderness of the MTP joints to palpation.    Lymphadenopathy: No submental, no  submandibular, and no occipital adenopathy present, has no cervical adenopathy or axillary lympadenopathy.    Neurological: Alert and oriented. No focal motor or sensory abnormalities. Strength is 5/5 Upper Extremities/Lower Extremities proximally and distally.    Skin: Skin is warm, dry and intact.  Mild Raynaud's of the feet    Psychiatric: Normal behavior.    Results:    Labs:      Lab Results   Component Value Date    WBC 4.0 07/17/2024    RBC 4.28 07/17/2024    HGB 13.6 07/17/2024    HCT 40.9 07/17/2024    MCV 95.6 07/17/2024    MCH 31.8 07/17/2024    MCHC 33.3 07/17/2024    RDW 12.9 07/17/2024    .0 (L) 07/17/2024       No components found for: \"RELY\", \"NMET\", \"MYEL\", \"PROMY\", \"MALINI\", \"ABSNEUTS\", \"ABSBANDS\", \"ABMM\", \"ABMY\", \"ABPM\", \"ABBL\"      Lab Results   Component Value Date     01/17/2024    K 3.3 (L) 01/17/2024    CO2 30.0 01/17/2024    BUN 10 01/17/2024    GFR >60 07/11/2008    ALB 3.8 01/17/2024    AST 7 (L) 01/17/2024    ALT 18 01/17/2024          No components found for: \"ESRWESTERGRN\"       Lab Results   Component Value Date    CRP <0.05 08/22/2016         Lab Results   Component Value Date    CLARITY Clear 04/12/2024    UROBILINOGEN 0.2 04/12/2024     @LABRCNTIP(RF,B12)@      [unfilled]    Imaging:  No results found.    Assessment & Plan:      34-year-old woman comes in for further evaluation of chronic joint pain    Fibromyalgia with multiple tender points nonrestorative sleep fatigue element of anxiety history of depression possible IBS  Raynaud's without digital ulceration  Mild osteoarthritis    Patient has no obvious synovitis on exam or history concerning for connective tissue disease  Will complete autoimmune workup.  It has been negative about 5 to 6 years ago  Raynaud's with minimal symptom score measures discussed  Continue Zoloft 100 mg daily  Consider switch to Cymbalta 60 mg daily in the future through PCP  Offered gabapentin which she is open to try.  States her mother is  on it  Will start 100 mg at bedtime for a week then 200 mg at bedtime for a week then 300 mg at bedtime for a week as tolerated.  Discussed with patient will take 60 weeks to take effect potential risk side effects discussed and written information given to patient for further reading  Encouraged exercise david chi yoga aquatic therapy stretching to be incorporating to her daily regimen  She has also have establish follow-up with physiatrist pain management physician which she will continue to follow-up with    Discussed importance of addressing her insomnia fatigue getting another sleep study and readdressing this through her PCP.  If she cannot tolerate gabapentin defer further management of her fibromyalgia to her PCP consider seeing psychiatry    Complete autoimmune workup and monitor for evolving connective tissue disease every 6 months in the meantime    She stays on gabapentin I will see her back in 4 months    Education and counseling provided to patient.  Instructed patient to call my office or seek medical attention immediately if symptoms worsen. Risks and side effects of medications and diagnosis discussed in detail and patient was given written information on new prescribed medications.    Return to clinic:  Return in about 4 months (around 12/6/2024).    Brenda Ruano MD  8/6/2024

## 2024-08-06 NOTE — PATIENT INSTRUCTIONS
Fast Facts    Fibromyalgia affects two - four percent of people, women more often than men.    Fibromyalgia is not an autoimmune or inflammation based illness, but research suggests the nervous system is involved.    Doctors diagnose fibromyalgia based on all the patient's relevant symptoms (what you feel), no longer just on the number of tender places during an examination.    There is no test to detect this disease, but you may need lab tests or X-rays to rule out other health problems.    Though there is no cure, medications can reduce symptoms in some patients.    Patients also may feel better with proper self-care, such as exercise and getting enough sleep.    Fibromyalgia is a common neurologic health problem that causes widespread pain and tenderness (sensitivity to touch). The pain and tenderness tend to come and go, and move about the body. Most often, people with this chronic (long-term) illness are fatigued (very tired) and have sleep problems. The diagnosis can be made with a careful examination.    Fibromyalgia is most common in women, though it can occur in men. It most often starts in middle adulthood, but can occur in the teen years and in old age. You are at higher risk for fibromyalgia if you have a rheumatic disease (health problem that affects the joints, muscles and bones). These include osteoarthritis, lupus, rheumatoid arthritis, or ankylosing spondylitis.    What is fibromyalgia?    What causes fibromyalgia?    The causes of fibromyalgia are unclear. They may be different in different people. Current research suggests involvement of the nervous system, particularly the central nervous system (brain and spinal cord). Fibromyalgia is not from an autoimmune, inflammation, joint, or muscle disorder. Fibromyalgia may run in families. There likely are certain genes that can make people more prone to getting fibromyalgia and the other health problems that can occur with it. Genes alone, though, do  not cause fibromyalgia.    There is most often some triggering factor that sets off fibromyalgia. It may be spine problems, arthritis, injury, or other type of physical stress. Emotional stress also may trigger this illness. The result is a change in the way the body \"talks\" with the spinal cord and brain. Levels of brain chemicals and proteins may change. More recently, Fibromyalgia has been described as Central Pain Amplification disorder, meaning the volume of pain sensation in the brain is turned up too high.    Although Fibromyalgia can affect quality of life, it is still considered medically benign. It does not cause any heart attacks, stroke, cancer, physical deformities, or loss of life.         How is fibromyalgia diagnosed?    A doctor will suspect fibromyalgia based on your symptoms. Doctors may require that you have tenderness to pressure or tender points at a specific number of certain spots before saying you have fibromyalgia, but they are not required to make the diagnosis (see the Box). A physical exam can be helpful to detect tenderness and to exclude other causes of muscle pain. There are no diagnostic tests (such as X-rays or blood tests) for this problem. Yet, you may need tests to rule out another health problem that can be confused with fibromyalgia.    Because widespread body pain is the main feature of fibromyalgia, health care providers will ask you to describe your pain. This may help tell the difference between fibromyalgia and other diseases with similar symptoms. Other conditions such as hypothyroidism (underactive thyroid gland) and polymyalgia rheumatica sometimes mimic fibromyalgia. Blood tests can tell if you have either of these problems. Sometimes, fibromyalgia is confused with rheumatoid arthritis or lupus. But, again, there is a difference in the symptoms, physical findings and blood tests that will help your health care provider detect these health problems. Unlike fibromyalgia,  these rheumatic diseases cause inflammation in the joints and tissues.    Criteria Needed for a Fibromyalgia Diagnosis      1. Pain and symptoms over the past week, based on the total of number of painful areas out of 19 parts of the body plus level of severity of these symptoms:    a. Fatigue    b. Waking unrefreshed    c. Cognitive (memory or thought) problems    Plus number of other general physical symptoms    2. Symptoms lasting at least three months at a similar level    3. No other health problem that would explain the pain and other symptoms    How is fibromyalgia treated?    There is no cure for fibromyalgia. However, symptoms can be treated with both non-drug and medication based treatments. Many times the best outcomes are achieved by using multiple types of treatments.    Non-Drug Therapies: People with fibromyalgia should use non-drug treatments as well as any medicines their doctors suggest. Research shows that the most effective treatment for fibromyalgia is physical exercise. Physical exercise should be used in addition to any drug treatment. Patients benefit most from regular aerobic exercises. Other body-based therapies, including Andres Chi and yoga, can ease fibromyalgia symptoms. Although you may be in pain, low impact physical exercise will not be harmful.    Cognitive behavioral therapy is a type of therapy focused on understanding how thoughts and behaviors affect pain and other symptoms. CBT and related treatments, such as mindfulness, can help patients learn symptom reduction skills that lessen pain. Mindfulness is a non-spiritual meditation practice that cultivates present moment awareness. Mindfulness based stress reduction has been shown to significantly improve symptoms of fibromyalgia.    Other complementary and alternative therapies (sometimes called CAM or integrative medicine), such as acupuncture, chiropractic and massage therapy, can be useful to manage fibromyalgia symptoms. Many of  these treatments, though, have not been well tested in patients with fibromyalgia.    It is important to address risk factors and triggers for fibromyalgia including sleep disorders, such as sleep apnea, and mood problems such as stress, anxiety, panic disorder, and depression. This may require involvement of other specialists such as a Sleep Medicine doctor, Psychiatrist, and therapist.     Medications: The U[place] Food and Drug Administration has approved three drugs for the treatment of fibromyalgia. They include two drugs that change some of the brain chemicals (serotonin and norepinephrine) that help control pain levels: duloxetine (Cymbalta) and milnacipran (Savella). Older drugs that affect these same brain chemicals also may be used to treat fibromyalgia. These include amitriptyline (Elavil) and cyclobenzaprine (Flexeril). Other antidepressant drugs can be helpful in some patients. Side effects vary by the drug. Ask your doctor about the risks and benefits of your medicine.    The other drug approved for fibromyalgia is pregabalin (Lyrica). Pregabalin and another drug, gabapentin (Neurontin), work by blocking the over activity of nerve cells involved in pain transmission. These medicines may cause dizziness, sleepiness, swelling and weight gain.    It is strongly recommended to avoid opioid narcotic medications for treating fibromyalgia. The reason for this is that research evidence shows these drugs are not of helpful to most people with fibromyalgia, and will cause greater pain sensitivity or make pain persist. Tramadol (Ultram) may be used to treat fibromyalgia pain if short-term use of an opioid narcotic is needed. Over-the-counter medicines such as acetaminophen (Tylenol) or nonsteroidal anti-inflammatory drugs (commonly called NSAIDs) like ibuprofen (Advil, Motrin) or naproxen (Aleve, Anaprox) are not effective for fibromyalgia pain. Yet, these drugs may be useful to treat the pain triggers of  fibromyalgia. Thus, they are most useful in people who have other causes for pain such as arthritis in addition to fibromyalgia.    For sleep problems, some of the medicines that treat pain also improve sleep. These include cyclobenzaprine (Flexeril), amitriptyline (Elavil), gabapentin (Neurontin) or pregabalin (Lyrica). It is not recommended that patients with fibromyalgia take sleeping medicines like zolpidem (Ambien) or benzodiazepine medications.      Daily reading is advised, 20 minutes per day.  \"12 Rules for Life: An Antidote to Chaos\" by Ryan Muñoz  \"The [facility] Guide to Stress-Free Living\" by Aurelio Miller MD   \"Hardwiring Happiness: The New Brain Science of Contentment, Calm, and Confidence\" by Gunner Hollingsworth  \"Get Out of Your Mind and Into Your Life: The New Acceptance and Commitment Therapy\" by Kareem Jones   \"Full Catastrophe Living\" and \"Wherever You Go, There You Are\" by Adan Telles   \"The Mindfulness Solution to Pain: Step-by-Step Techniques for Chronic Pain Management\", by Na Torres   \"Is It [place] Dying For?: How To Make Stress Work For You - Not Against You\" by Dr. Garrett Lake  \"Mind over Mood\" by Oc.  \"[facility] on Chronic Pain\"  \"Radical Acceptance: Embracing Your Life With the Heart of a Buddha \" by Spring Powell    \"Man's Search for Meaning\", by Shaan Contreras  \"Left to Tell: Discovering God Amidst the [place] Holocaust\" by Bryant Buckley   \"Waking the Tiger : Healing Trauma : The Innate Capacity to Transform Overwhelming Experiences\", by Ras Garcia     \"The Emperor's Handbook: A New Translation of The Meditations\" by Alessandro Leroy   \"How to be a Stoic\" in [facility], by Tiana Atkins (http://www.AgileSource.Auctelia/magazine/2016/12/19/wzk-ei-sm-a-stoic)  How to Be a Stoic by Tariq Leroy & Marlenic Philosophy  https://www.Codeshipube.com/watch?v=d34beu-FNQa  (http://opinionator.blogs.Rewardix.com/2015/02/02/dco-tt-nt-a-stoic/?emc=eta1)    \"How to Fail at Almost Everything and Still Win Big: Kind of the Story of My Life\" by Yuriy Schilling  \"Antifragile: Things That Gain from Disorder\" by Chris Reddy  \"Tools of Titans: The Tactics, Routines, and Habits of Billionaires, Icons, and World-Class Performers\", by Jovan Huggins   Paced breathing, such as in meditation, has been found to be helpful by some patients. Several mediation apps are available on Google Play and the [employer]. She should perform this 10- 15 minutes twice daily for a couple of months and then assess its utility. Utilizing calming visualizations, such as nature scenes, can enhance the effect of the paced-breathing exercises ([employer] search: meditation and nature). Mind/body therapies, such as yoga, david chi, exercise, and biofeedback, can also decrease the physical effects of stress.     These instructions by Dr. Aurelio Miller offer breathing exercises that may be useful for training:  Calm and Energize: A Meditation With Your Breath (https://www.Codeshipube.com/watch?v=VLODuE9MGp7)  Rhythm: A Meditation With a Word (https://www.Codeshipube.com/watch?v=FnlI-e4uxHw)  These instructions by Dr. Andrew Weil offer breathing exercises that may be useful for training: \"Three Breathing Exercises\" http://www.drweil.Picolight/drw/u/YCS08571/three-breathing-exercises.html.   Meditative movement therapies employ these breathing techniques and can help decrease the physical effects of stress including yoga, david chi, exercise, and biofeedback.   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 The Association for Behavioral and Cognitive Therapies (ABCT) Find A Therapist Service: http://www.findcbt.org/xFAT/    Personality traits developed when younger are how one kavita with present life. A history of traumatic life events often form the basis for central sensitization. As adults, such individuals often show tendencies for people-pleasing, perfectionism, and a strong sense of responsibility. Although this can make one an ideal worker, it comes at the cost of being physically exhausting and serves as a source of great frustration when symptoms prevent expected performance.     Often the manifestations of central sensitization coexist with mood disorders such as depression and anxiety. When these conditions are present, they can amply some of the debilitating symptoms and complicate recovery. Therefore, we strongly encourage our patients to work with their local physicians when mood symptoms are present. Psychiatry referrals can be facilitated by local physicians if necessary.     SLEEP  She has been diagnosed with possible narcolepsy, will meet with Sleep medicine to discuss diagnosis and treatment.    PHYSICAL AND OCCUPATIONAL THERAPY  Many patients will benefit from a brief period of targeted physical therapy for 8-12 weeks to improve musculoskeletal symptoms. Additionally, occupational therapy can be helpful for relaxation, stress management, life skills, and energy conservation. Referrals can be facilitated locally by the primary care provider.    EXERCISE  Above all else, she needs to begin cardio and weight training. An exercise program either independently or supervised by a  or  physical therapist should focus on proximal lower extremity resistance strength training as well as aerobic fitness.   Some patients can benefit initially by using local [facility] for supervised slow incremental reconditioning.    Cardio should be done for at least 30 minutes every day.   The target heart rate should be at least 130 and perhaps close to 160 bpm, followed by a long cool down to allow heart rate recovery before stopping.   Recumbent exercises are often better tolerated, including recumbent biking, rowing, or swimming (e.g., walking or jogging in water).   Examples of cardio exercise include brisk walking, jogging, elliptical machine, stair stepper, upright stationary bicycle, biking outdoors, and group fitness classes at a local gym such as [employer], cycling class, or [email or web address].     1) Postural training: It is important to remain upright as much as possible during the day. Patients with orthostatic intolerance can improve their tolerance of orthostatic stress by increasing each day the time spent upright. Prolonged bedrest is best avoided, as this can lead to orthostatic deconditioning.    2) Strength training: Exercises that strengthen the thigh muscles, such as cycling, can be helpful. Weight training should target the proximal lower limbs and core with such exercises as leg presses, toe presses, leg extensions, leg curls. Yoga and Pilates can also be helpful.  Daily large muscle exercises facilitate venous return.  These include bicep curls with 3-5 pound weights, squats, and heel raises working up to 25 repetitions of each exercise.   These can be done as part of the morning getting-out-of-bed routine, improving symptoms for the next several hours.     MEDICATIONS  There is no evidence that pure opioids, such as morphine or oxycodone have any benefit in fibromyalgia. In addition, opioid-induced hyperalgesia is a serious concern, in which pain is worsened by opioid use. Although  Tramadol has moderate evidence for efficacy, adverse events, drug cross-reactions, and opioid-induced hyperalgesia argue against its consistent use.     Modest improvement of fibromyalgia has been observed with drugs targeting a diverse range of molecular mechanisms. However, no single drug has offered substantial efficacy. The heterogeneity of this disorder and widely varied responses to medications suggests existence of patient subgroups. As a result, therapies for fibromyalgia need to recognize the impact of central and peripheral aspects of the pathophysiology utilizing both medications and nonpharmacologic approaches.    From a medication perspective, FDA-approved medications for fibromyalgia include duloxetine, milnacipran, and pregabalin. Additional options (non-FDA approved) include gabapentin and the older tricyclic agents (amitriptyline, nortriptyline). Ideally it is best to start with a single medication and slowly titrate upwards to a therapeutic dose, attempting to minimize adverse effects. A combined medication approach can also be undertaken. The advantage of this approach is the use of two different medications with different mechanisms at lower doses to minimize side effects with potential additive benefit from an overall symptom standpoint.    A reasonable approach to medication initiation and titration is listed below. The rate of titration should be based on medication tolerability, presence of adverse effects, and the patient's sensitivity towards medications. Following medication initiation, a patient must be reevaluated by their local provider for efficacy and side effects.     FDA approved:  Duloxetine (SNRI): Best for pain + mood symptoms.  · Start at 20 mg daily, increase over several weeks-to-months to 60 mg   · Maximum dose: 60 mg daily    Milnacipran (SNRI): Best for pain + mood symptoms.  · Start at 6.25-12.5 mg daily, increase over several weeks-to-months 25-50 mg   · Maximum dose: 100  mg twice daily    Pregabalin (Alpha-2-delta calcium channel ligand): Best for pain + sleep + paresthesia symptoms.   · Start at 25 mg daily, increase over several weeks-to-months to 50-75 mg   · Maximum dose: 225 mg twice daily    Non-FDA approved:  Gabapentin: Best for pain + sleep + paresthesia symptoms.  · Start at 100-300 mg nightly, increase over several weeks-to-months to 600 mg   · If efficacious, a morning or afternoon dose can be started with gradual uptitration  · Maximum dose: 2400 mg total per day    Amitriptyline or Nortriptyline: Best for pain + sleep + paresthesia symptoms.  · Start at 10 mg nightly, and increase over several weeks-to-months to 50 mg  · Maximum dose: 75 mg daily    COMBINED MEDICATION APPROACH  A combination of low dose SNRI (Duloxetine 30 mg daily, or Milnacipran 12.5-25 mg daily) along with a low dose of pregabalin or gabapentin (pregabalin 50-75 mg or gabapentin 300 mg at night) could be considered. The advantage of this approach is use of two different medications with different mechanisms at lower doses to minimize side effects with potential additive benefit from an overall symptom standpoint.     DIET  Low inflammation diets can be helpful, such as the Mediterranean diet.  There is some recent evidence that probiotics alters the intestinal microbiome, which has considerable effects on the enteric nervous system connections with the central nervous system, including direct effects on intestinal function, mood and anxiety.  Probiotics, especially when coupled with prebiotic, can also be helpful.   Fermented milk products such as Activia yogurt or Jemma's Kefir are useful examples. Notably, increased fiber, a prebiotic, can also be helpful at reducing inflammation, according to studies on osteoarthritis.    FATIGUE  Oncologists have been recommending ginseng for cancer patients to treat their chronic fatigue,   The mechanism for cancer-related fatigue appears to be the same as the  proposed origin of chronic fatigue.  \"The mechanism by which American ginseng may be able to moderate fatigue is evidenced by preclinical data. Several investigators have established a consistent link between CRF and inflammation and have provided data to support dysregulation of the hypothalamic pituitary adrenal axis. These data suggest that chronic fatigue in cancer is associated with an inability for the hypothalamic pituitary adrenal axis to regulate inflammatory processes and that concentrations of inflammatory cytokines remain elevated instead of reachieving homeostasis. Preclinical data evaluating the biologic activity of ginseng have demonstrated the ability of ginseng to downregulate inflammatory pathways, decrease inflammation, and modulate cortisol and the impact of chronic stress on the hypothalamic pituitary adrenal axis.\"  DOSE:  1,000 mg taken 2 times per day, at breakfast and lunch (before noon to ensure it doesn't negatively impact sleep although this has not been reported).  Vitamin C 1 g daily may help with reduction of tumor necrosis factor (TNF), an inflammatory cytokine known to be active in chronic fatigue symptoms.  Vitamin B6 is known to reduce inflammatory cytokines in inflammatory disorder such as rheumatoid arthritis. Recommended daily allowance (RDA) for oral intake is:  19 to 50 years: 1.3 mg per day.  >51 years: Females: 1.5 mg, Males: 1.7 mg per day.  Vitamin B6 deficiency can be treated with 100 mg daily for 4 weeks.  Excessive intake can cause neurologic affects such as neuropathy symptoms.

## 2024-08-14 ENCOUNTER — LAB ENCOUNTER (OUTPATIENT)
Dept: LAB | Age: 34
End: 2024-08-14
Attending: INTERNAL MEDICINE
Payer: COMMERCIAL

## 2024-08-14 DIAGNOSIS — D68.51 HOMOZYGOUS FACTOR V LEIDEN MUTATION (HCC): ICD-10-CM

## 2024-08-14 DIAGNOSIS — M79.7 FIBROMYALGIA: ICD-10-CM

## 2024-08-14 DIAGNOSIS — I73.00 RAYNAUD'S DISEASE WITHOUT GANGRENE: ICD-10-CM

## 2024-08-14 LAB
BILIRUB UR QL STRIP.AUTO: NEGATIVE
C3 SERPL-MCNC: 74.8 MG/DL (ref 84–160)
C4 SERPL-MCNC: 16.3 MG/DL (ref 12–36)
CLARITY UR REFRACT.AUTO: CLEAR
COLOR UR AUTO: COLORLESS
CRP SERPL-MCNC: <0.4 MG/DL (ref ?–0.5)
ERYTHROCYTE [SEDIMENTATION RATE] IN BLOOD: 2 MM/HR
GLUCOSE UR STRIP.AUTO-MCNC: NORMAL MG/DL
IGA SERPL-MCNC: 87.4 MG/DL (ref 40–350)
IGM SERPL-MCNC: 107 MG/DL (ref 50–300)
IMMUNOGLOBULIN PNL SER-MCNC: 675 MG/DL (ref 650–1600)
KETONES UR STRIP.AUTO-MCNC: NEGATIVE MG/DL
LEUKOCYTE ESTERASE UR QL STRIP.AUTO: NEGATIVE
NITRITE UR QL STRIP.AUTO: NEGATIVE
PH UR STRIP.AUTO: 7 [PH] (ref 5–8)
PROT UR STRIP.AUTO-MCNC: NEGATIVE MG/DL
RBC UR QL AUTO: NEGATIVE
RHEUMATOID FACT SERPL-ACNC: 4.9 IU/ML (ref ?–14)
SP GR UR STRIP.AUTO: 1.01 (ref 1–1.03)
THYROGLOB SERPL-MCNC: <15 U/ML (ref ?–60)
THYROPEROXIDASE AB SERPL-ACNC: 29 U/ML (ref ?–60)
URATE SERPL-MCNC: 2.7 MG/DL
UROBILINOGEN UR STRIP.AUTO-MCNC: NORMAL MG/DL
VIT B12 SERPL-MCNC: 328 PG/ML (ref 211–911)
VIT D+METAB SERPL-MCNC: 31.9 NG/ML (ref 30–100)

## 2024-08-14 PROCEDURE — 85652 RBC SED RATE AUTOMATED: CPT

## 2024-08-14 PROCEDURE — 81003 URINALYSIS AUTO W/O SCOPE: CPT

## 2024-08-14 PROCEDURE — 86235 NUCLEAR ANTIGEN ANTIBODY: CPT

## 2024-08-14 PROCEDURE — 86160 COMPLEMENT ANTIGEN: CPT

## 2024-08-14 PROCEDURE — 36415 COLL VENOUS BLD VENIPUNCTURE: CPT

## 2024-08-14 PROCEDURE — 84207 ASSAY OF VITAMIN B-6: CPT

## 2024-08-14 PROCEDURE — 87522 HEPATITIS C REVRS TRNSCRPJ: CPT

## 2024-08-14 PROCEDURE — 86431 RHEUMATOID FACTOR QUANT: CPT

## 2024-08-14 PROCEDURE — 85610 PROTHROMBIN TIME: CPT

## 2024-08-14 PROCEDURE — 83516 IMMUNOASSAY NONANTIBODY: CPT

## 2024-08-14 PROCEDURE — 86140 C-REACTIVE PROTEIN: CPT

## 2024-08-14 PROCEDURE — 84550 ASSAY OF BLOOD/URIC ACID: CPT

## 2024-08-14 PROCEDURE — 83521 IG LIGHT CHAINS FREE EACH: CPT

## 2024-08-14 PROCEDURE — 86800 THYROGLOBULIN ANTIBODY: CPT

## 2024-08-14 PROCEDURE — 82784 ASSAY IGA/IGD/IGG/IGM EACH: CPT

## 2024-08-14 PROCEDURE — 86225 DNA ANTIBODY NATIVE: CPT

## 2024-08-14 PROCEDURE — 82306 VITAMIN D 25 HYDROXY: CPT

## 2024-08-14 PROCEDURE — 82607 VITAMIN B-12: CPT

## 2024-08-14 PROCEDURE — 86376 MICROSOMAL ANTIBODY EACH: CPT

## 2024-08-14 PROCEDURE — 86334 IMMUNOFIX E-PHORESIS SERUM: CPT

## 2024-08-14 PROCEDURE — 86200 CCP ANTIBODY: CPT | Performed by: INTERNAL MEDICINE

## 2024-08-14 PROCEDURE — 84165 PROTEIN E-PHORESIS SERUM: CPT

## 2024-08-14 PROCEDURE — 86146 BETA-2 GLYCOPROTEIN ANTIBODY: CPT

## 2024-08-14 PROCEDURE — 86147 CARDIOLIPIN ANTIBODY EA IG: CPT

## 2024-08-15 LAB
CCP IGG SERPL-ACNC: 0.8 U/ML (ref 0–6.9)
CENTROMERE IGG SER-ACNC: <0.4 U/ML
DSDNA IGG SERPL IA-ACNC: 0.7 IU/ML
ENA RNP IGG SER IA-ACNC: 2.2 U/ML
ENA SCL70 IGG SER IA-ACNC: 0.6 U/ML
ENA SM IGG SER IA-ACNC: <0.7 U/ML
ENA SS-A IGG SER IA-ACNC: <0.4 U/ML
ENA SS-B IGG SER IA-ACNC: <0.4 U/ML
KAPPA LC FREE SER-MCNC: 1.01 MG/DL (ref 0.33–1.94)
KAPPA LC FREE/LAMBDA FREE SER NEPH: 0.91 {RATIO} (ref 0.26–1.65)
LAMBDA LC FREE SERPL-MCNC: 1.1 MG/DL (ref 0.57–2.63)
U1 SNRNP IGG SER IA-ACNC: 1.1 U/ML

## 2024-08-16 LAB
APTT: 30.7 SEC
B2 GLYCOPROT I IGG AB: <9 GPI IGG UNITS
B2 GLYCOPROT I IGM AB: <9 GPI IGM UNITS
CARDIOLIPIN IGG: <9 GPL U/ML
CARDIOLIPIN IGM: <9 MPL U/ML
DRVVT: 34.3 SEC
HEXAGONAL PHASE PHOSPHOLIPID: 5 SEC
INR: 1.1
PT: 11.6 SEC
THROMBIN TIME: 22.6 SEC

## 2024-08-19 LAB
ALBUMIN SERPL ELPH-MCNC: 4.43 G/DL (ref 3.75–5.21)
ALBUMIN/GLOB SERPL: 2.25 {RATIO} (ref 1–2)
ALPHA1 GLOB SERPL ELPH-MCNC: 0.24 G/DL (ref 0.19–0.46)
ALPHA2 GLOB SERPL ELPH-MCNC: 0.5 G/DL (ref 0.48–1.05)
B-GLOBULIN SERPL ELPH-MCNC: 0.54 G/DL (ref 0.68–1.23)
GAMMA GLOB SERPL ELPH-MCNC: 0.69 G/DL (ref 0.62–1.7)
PROT SERPL-MCNC: 6.4 G/DL (ref 5.7–8.2)

## 2024-08-21 LAB — ANTI-RNA POLYMERASE III: <20 UNITS

## 2024-08-22 LAB — VITAMIN B6: 7.7 UG/L

## 2024-11-18 ENCOUNTER — E-ADVICE (OUTPATIENT)
Dept: ALLERGY | Age: 34
End: 2024-11-18

## 2024-11-18 DIAGNOSIS — H10.10 ALLERGIC RHINOCONJUNCTIVITIS: Primary | ICD-10-CM

## 2024-11-18 DIAGNOSIS — J30.9 ALLERGIC RHINOCONJUNCTIVITIS: Primary | ICD-10-CM

## 2024-11-18 DIAGNOSIS — J32.9 CHRONIC SINUSITIS, UNSPECIFIED LOCATION: ICD-10-CM

## 2024-11-19 ENCOUNTER — APPOINTMENT (OUTPATIENT)
Dept: GENERAL RADIOLOGY | Age: 34
End: 2024-11-19
Payer: COMMERCIAL

## 2024-11-19 ENCOUNTER — IMAGING SERVICES (OUTPATIENT)
Dept: GENERAL RADIOLOGY | Age: 34
End: 2024-11-19
Attending: ALLERGY & IMMUNOLOGY

## 2024-11-19 ENCOUNTER — HOSPITAL ENCOUNTER (EMERGENCY)
Age: 34
Discharge: HOME | End: 2024-11-19
Attending: EMERGENCY MEDICINE
Payer: COMMERCIAL

## 2024-11-19 VITALS
HEART RATE: 86 BPM | BODY MASS INDEX: 21.97 KG/M2 | WEIGHT: 140 LBS | DIASTOLIC BLOOD PRESSURE: 79 MMHG | TEMPERATURE: 98 F | OXYGEN SATURATION: 100 % | HEIGHT: 67 IN | SYSTOLIC BLOOD PRESSURE: 137 MMHG | RESPIRATION RATE: 16 BRPM

## 2024-11-19 DIAGNOSIS — H10.10 ALLERGIC RHINOCONJUNCTIVITIS: ICD-10-CM

## 2024-11-19 DIAGNOSIS — J30.9 ALLERGIC RHINOCONJUNCTIVITIS: ICD-10-CM

## 2024-11-19 DIAGNOSIS — M79.674 PAIN OF RIGHT GREAT TOE: Primary | ICD-10-CM

## 2024-11-19 PROCEDURE — 70210 X-RAY EXAM OF SINUSES: CPT | Performed by: RADIOLOGY

## 2024-11-19 PROCEDURE — 99283 EMERGENCY DEPT VISIT LOW MDM: CPT

## 2024-11-19 PROCEDURE — 73660 X-RAY EXAM OF TOE(S): CPT

## 2024-11-19 NOTE — ED INITIAL ASSESSMENT (HPI)
Right foot, 1st toe pain and bruising. Denies injury. Seen her PCP today who ordered an MRI; still needs to schedule.

## 2024-11-20 NOTE — ED PROVIDER NOTES
Patient Seen in: [facility] In [place]      History     Chief Complaint   Patient presents with    Leg or Foot Injury     Stated Complaint: swollen r foot 1st digit today, pain there since Sunday    Subjective:   HPI      Patient states that she has noted pain and discoloration on the plantar aspect of her right great toe today.  She states that she has difficulty ambulating because of the pain.  There is no history of acute trauma.  The patient has no other digital or other musculoskeletal discomfort.  She has no swelling.  She has no symptoms proximal to the MTP joint.  Patient was seen by primary physician who ordered an outpatient MRI.  She presented to the emergency department to have further evaluation.    Objective:     Past Medical History:    ALLERGIC RHINITIS    Anxiety    Cyst, thyroid    has appt to see Endocrinologist February 2017    Endometrial hyperplasia    encapsalated placenta    HEADACHES    migraines    Homozygous Factor V Leiden mutation (HCC)    Polyp at cervical os    uterine polyp removal              Past Surgical History:   Procedure Laterality Date    D & c  02/2016    with hysteroscopy, benign uterine polyp    Hysteroscopy  2018    D&C encapsalated placenta    Other surgical history  12/2015    wisdom teeth    Tonsillectomy  1991                Social History     Socioeconomic History    Marital status:    Tobacco Use    Smoking status: Never    Smokeless tobacco: Never   Vaping Use    Vaping status: Never Used   Substance and Sexual Activity    Alcohol use: Not Currently     Comment: occ     Drug use: No    Sexual activity: Yes     Partners: Male     Birth control/protection: Condom     Social Drivers of Health     Food Insecurity: Low Risk  (4/30/2024)    Received from [facility]    Food Insecurity     Have there been times that your food ran out, and you didn't have money to get more?: No     Are there times that you worry that this  might happen?: No   Transportation Needs: Low Risk  (4/30/2024)    Received from [facility]    Transportation Needs     Do you have trouble getting transportation to medical appointments?: No    Received from [facility], [facility]    Social Connections                  Physical Exam     ED Triage Vitals [11/19/24 1639]   /79   Pulse 86   Resp 16   Temp 98.4 °F (36.9 °C)   Temp src    SpO2 100 %   O2 Device None (Room air)       Current Vitals:   Vital Signs  BP: 137/79  Pulse: 86  Resp: 16  Temp: 98.4 °F (36.9 °C)    Oxygen Therapy  SpO2: 100 %  O2 Device: None (Room air)        Physical Exam  Vitals and nursing note reviewed.   Constitutional:       General: She is not in acute distress.     Appearance: Normal appearance. She is well-developed.   Musculoskeletal:      Comments: Patient is noted to have ecchymosis and tenderness to the plantar aspect of her right great toe.  Patient has no deformity.  No other digital or foot discomfort is noted.  Sensation is intact.  Patient has normal dorsalis pedis and tibialis posterior pulses.  No evidence of vascular compromise is noted.   Neurological:      Mental Status: She is alert and oriented to person, place, and time.            ED Course   Labs Reviewed - No data to display    ED Course as of 11/19/24 2026  ------------------------------------------------------------  Time: 11/19 1715  Value: XR TOE(S) (MIN 2 VIEWS), RIGHT 1ST (CPT=73660)  Comment: Images were independently viewed by me and no acute fracture or dislocation noted.              MDM      Patient presents to the emergency department with swelling and ecchymosis on the plantar aspect of her right great toe along with tenderness.  The exact etiology of this is undetermined.  There does not appear to be clinical evidence of acute vascular compromise, acute bony injury, acute infection.  Patient was discharged home with postop shoe placed  for comfort.  She was directed to follow-up with primary care physician and obtain MRI as an outpatient as already ordered by her primary physician.        Medical Decision Making      Disposition and Plan     Clinical Impression:  1. Pain of right great toe         Disposition:  Discharge  11/19/2024  5:22 pm    Follow-up:  Daniel Barkley MD  [id number]  [facility] [address]  [phone]    Follow up            Medications Prescribed:  Discharge Medication List as of 11/19/2024  5:30 PM              Supplementary Documentation:

## 2024-12-03 ENCOUNTER — HOSPITAL ENCOUNTER (OUTPATIENT)
Dept: CT IMAGING | Facility: HOSPITAL | Age: 34
Discharge: HOME | End: 2024-12-03
Attending: INTERNAL MEDICINE
Payer: COMMERCIAL

## 2024-12-03 ENCOUNTER — HOSPITAL ENCOUNTER (OUTPATIENT)
Dept: CT IMAGING | Facility: HOSPITAL | Age: 34
End: 2024-12-03
Attending: INTERNAL MEDICINE
Payer: COMMERCIAL

## 2024-12-03 DIAGNOSIS — R09.81 SINUS CONGESTION: ICD-10-CM

## 2024-12-03 PROCEDURE — 70490 CT SOFT TISSUE NECK W/O DYE: CPT | Performed by: INTERNAL MEDICINE

## 2024-12-17 ENCOUNTER — HOSPITAL ENCOUNTER (OUTPATIENT)
Dept: MRI IMAGING | Age: 34
Discharge: HOME | End: 2024-12-17
Attending: INTERNAL MEDICINE
Payer: COMMERCIAL

## 2024-12-17 DIAGNOSIS — M79.673 FOOT PAIN: ICD-10-CM

## 2024-12-17 DIAGNOSIS — M79.676 TOE PAIN: ICD-10-CM

## 2024-12-17 PROCEDURE — 73718 MRI LOWER EXTREMITY W/O DYE: CPT | Performed by: INTERNAL MEDICINE

## 2025-01-05 ENCOUNTER — HOSPITAL ENCOUNTER (EMERGENCY)
Age: 35
Discharge: HOME | End: 2025-01-05
Attending: EMERGENCY MEDICINE
Payer: COMMERCIAL

## 2025-01-05 ENCOUNTER — APPOINTMENT (OUTPATIENT)
Dept: GENERAL RADIOLOGY | Age: 35
End: 2025-01-05
Payer: COMMERCIAL

## 2025-01-05 VITALS
BODY MASS INDEX: 21.97 KG/M2 | SYSTOLIC BLOOD PRESSURE: 113 MMHG | DIASTOLIC BLOOD PRESSURE: 79 MMHG | WEIGHT: 140 LBS | RESPIRATION RATE: 16 BRPM | HEART RATE: 92 BPM | TEMPERATURE: 98 F | HEIGHT: 67 IN | OXYGEN SATURATION: 99 %

## 2025-01-05 DIAGNOSIS — S60.10XA SUBUNGUAL HEMATOMA OF DIGIT OF HAND, INITIAL ENCOUNTER: Primary | ICD-10-CM

## 2025-01-05 PROCEDURE — 11740 EVACUATION SUBUNGUAL HMTMA: CPT

## 2025-01-05 PROCEDURE — 99283 EMERGENCY DEPT VISIT LOW MDM: CPT

## 2025-01-05 PROCEDURE — 73140 X-RAY EXAM OF FINGER(S): CPT

## 2025-01-05 NOTE — ED PROVIDER NOTES
Patient Seen in: [facility] In [place]      History     Chief Complaint   Patient presents with    Arm or Hand Injury     Stated Complaint: finger injury    Subjective:   HPI      34-year-old female presents the with complaints of left hand third digit pain after slamming a door on her finger.  Reports incident occurred a few hours prior to arrival.  Denies any further injuries.  Denies any tingling numbness or weakness no bleeding noted except for a bruise underneath her nail  And on her finger pad.    Objective:     Past Medical History:    ALLERGIC RHINITIS    Anxiety    Cyst, thyroid    has appt to see Endocrinologist February 2017    Endometrial hyperplasia    encapsalated placenta    Eosinophilic esophagitis    HEADACHES    migraines    Homozygous Factor V Leiden mutation (HCC)    Polyp at cervical os    uterine polyp removal              Past Surgical History:   Procedure Laterality Date    D & c  02/2016    with hysteroscopy, benign uterine polyp    Hysteroscopy  2018    D&C encapsalated placenta    Other surgical history  12/2015    wisdom teeth    Tonsillectomy  1991                Social History     Socioeconomic History    Marital status:    Tobacco Use    Smoking status: Never    Smokeless tobacco: Never   Vaping Use    Vaping status: Never Used   Substance and Sexual Activity    Alcohol use: Not Currently     Comment: occ     Drug use: No    Sexual activity: Yes     Partners: Male     Birth control/protection: Condom     Social Drivers of Health     Food Insecurity: Low Risk  (4/30/2024)    Received from [facility]    Food Insecurity     Have there been times that your food ran out, and you didn't have money to get more?: No     Are there times that you worry that this might happen?: No   Transportation Needs: Low Risk  (4/30/2024)    Received from [facility]    Transportation Needs     Do you have trouble getting transportation  to medical appointments?: No    [facility], [facility]    Social Connections                  Physical Exam     ED Triage Vitals [01/05/25 0237]   /79   Pulse 92   Resp 16   Temp 98.3 °F (36.8 °C)   Temp src Temporal   SpO2 99 %   O2 Device None (Room air)       Current Vitals:   Vital Signs  BP: 113/79  Pulse: 92  Resp: 16  Temp: 98.3 °F (36.8 °C)  Temp src: Temporal    Oxygen Therapy  SpO2: 99 %  O2 Device: None (Room air)        Physical Exam  Musculoskeletal:      Comments: Subungual hematoma less than 50% left hand third digit nailbed bruising on the finger pad of the same digit no active bleeding full range of motion at the DIP PIP and MCP joints             ED Course   Labs Reviewed - No data to display             Left hand and third digit radiographs(s)    COMPARISON: None relevant.      IMPRESSION:    No acute displaced fracture or dislocation.  No radiopaque foreign body.         MDM      34-year-old female presenting with complaints of left hand third digit pain.  Vital stable arrival patient appears nontoxic semination less than 50% subungual hematoma left hand third digit as well as a hematoma overlying the palmar aspect of the finger.  X-rays negative for acute fracture dislocation deformity preventing evaluation of the imaging.  The subungual hematoma was drained patient was feeling significant improved discussed abortive care close follow-up and strict return precautions.  Patient shows understanding plan and is in agreement with plan discharged home in stable condition.        Medical Decision Making      Disposition and Plan     Clinical Impression:  1. Subungual hematoma of digit of hand, initial encounter         Disposition:  Discharge  1/5/2025  4:17 am    Follow-up:  Daniel Barkley MD  [id number] [address]  [phone]    Go in 1 week(s)            Medications Prescribed:  Current Discharge Medication List               Supplementary Documentation:

## 2025-01-05 NOTE — DISCHARGE INSTRUCTIONS
Apply ice for pain control alternate Tylenol and Motrin as needed for pain control.  Keep the finger clean and dry do not soak it in water.

## 2025-02-10 ENCOUNTER — OFFICE VISIT (OUTPATIENT)
Dept: RHEUMATOLOGY | Facility: CLINIC | Age: 35
End: 2025-02-10
Payer: COMMERCIAL

## 2025-02-10 VITALS
WEIGHT: 142 LBS | HEART RATE: 76 BPM | BODY MASS INDEX: 22.29 KG/M2 | DIASTOLIC BLOOD PRESSURE: 70 MMHG | OXYGEN SATURATION: 98 % | HEIGHT: 67 IN | SYSTOLIC BLOOD PRESSURE: 100 MMHG | TEMPERATURE: 98 F | RESPIRATION RATE: 16 BRPM

## 2025-02-10 DIAGNOSIS — I73.00 RAYNAUD DISEASE WITHOUT GANGRENE: ICD-10-CM

## 2025-02-10 DIAGNOSIS — F41.9 ANXIETY: ICD-10-CM

## 2025-02-10 DIAGNOSIS — M79.7 FIBROMYALGIA: Primary | ICD-10-CM

## 2025-02-10 PROCEDURE — 3078F DIAST BP <80 MM HG: CPT | Performed by: INTERNAL MEDICINE

## 2025-02-10 PROCEDURE — 3074F SYST BP LT 130 MM HG: CPT | Performed by: INTERNAL MEDICINE

## 2025-02-10 PROCEDURE — 99214 OFFICE O/P EST MOD 30 MIN: CPT | Performed by: INTERNAL MEDICINE

## 2025-02-10 PROCEDURE — 3008F BODY MASS INDEX DOCD: CPT | Performed by: INTERNAL MEDICINE

## 2025-02-10 RX ORDER — GABAPENTIN 100 MG/1
300 CAPSULE ORAL NIGHTLY
Qty: 90 CAPSULE | Refills: 5 | Status: SHIPPED | OUTPATIENT
Start: 2025-02-10

## 2025-02-10 RX ORDER — GABAPENTIN 100 MG/1
200 CAPSULE ORAL NIGHTLY
COMMUNITY
End: 2025-02-10

## 2025-02-10 NOTE — PATIENT INSTRUCTIONS
Daily reading is advised, 20 minutes per day.  \"12 Rules for Life: An Antidote to Chaos\" by Ryan Muñoz  \"The [facility] Guide to Stress-Free Living\" by Aurelio Miller MD   \"Hardwiring Happiness: The New Brain Science of Contentment, Calm, and Confidence\" by Gunner Hollingsworth  \"Get Out of Your Mind and Into Your Life: The New Acceptance and Commitment Therapy\" by Kareem Jones   \"Full Catastrophe Living\" and \"Wherever You Go, There You Are\" by Adan Telles   \"The Mindfulness Solution to Pain: Step-by-Step Techniques for Chronic Pain Management\", by Na Torres   \"Is It [place] Dying For?: How To Make Stress Work For You - Not Against You\" by Dr. Garrett Lake  \"Mind over Mood\" by Oc.  \"[facility] on Chronic Pain\"  \"Radical Acceptance: Embracing Your Life With the Heart of a Buddha \" by Spring Powell    \"Man's Search for Meaning\", by Shaan Contreras  \"Left to Tell: Discovering God Amidst the [place] Holocaust\" by Bryant Buckley   \"Waking the Tiger : Healing Trauma : The Innate Capacity to Transform Overwhelming Experiences\", by Ras Garcia     \"The Emperor's Handbook: A New Translation of The Meditations\" by Alessandro Leroy   \"How to be a Stoic\" in [facility], by Tiana Atkins (http://www.Payteller.com/magazine/2016/12/19/ilt-so-bx-a-stoic)  How to Be a Stoic by Tariq Leroy & Stoic Philosophy https://www.youtube.com/watch?v=v67xeu-IDXt  (http://opinionator.blogs.Mamaya.[employer]/2015/02/02/mwn-nk-lz-a-stoic/?emc=eta1)    \"How to Fail at Almost Everything and Still Win Big: Kind of the Story of My Life\" by Yuriy Schilling  \"Antifragile: Things That Gain from Disorder\" by Chris Reddy  \"Tools of Titans: The Tactics, Routines, and Habits of Billionaires, Icons, and World-Class Performers\", by Jovan Huggins   Paced breathing, such as in meditation, has been found to be helpful by some patients. Several mediation apps are available on Google Play and the  [employer]. She should perform this 10- 15 minutes twice daily for a couple of months and then assess its utility. Utilizing calming visualizations, such as nature scenes, can enhance the effect of the paced-breathing exercises (YouTube search: meditation and nature). Mind/body therapies, such as yoga, david chi, exercise, and biofeedback, can also decrease the physical effects of stress.     These instructions by Dr. Aurelio Miller offer breathing exercises that may be useful for training:  Calm and Energize: A Meditation With Your Breath (https://www.[employer].com/watch?v=UQRAdU1CMn2)  Rhythm: A Meditation With a Word (https://www.[employer].com/watch?v=FnlI-e4uxHw)  These instructions by Dr. Andrew Weil offer breathing exercises that may be useful for training: \"Three Breathing Exercises\" http://www.drweil.com/drw/u/WEJ75831/three-breathing-exercises.html.   Meditative movement therapies employ these breathing techniques and can help decrease the physical effects of stress including yoga, david chi, exercise, and biofeedback.   Jvnqff5nhjxu: http://t3zovlex.[place].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 The Association for Behavioral and Cognitive Therapies (ABCT) Find A Therapist Service: http://www.findcbt.org/xFAT/    Personality traits developed when younger are how one kavita with present life. A history of traumatic life events often form the basis for central  sensitization. As adults, such individuals often show tendencies for people-pleasing, perfectionism, and a strong sense of responsibility. Although this can make one an ideal worker, it comes at the cost of being physically exhausting and serves as a source of great frustration when symptoms prevent expected performance.     Often the manifestations of central sensitization coexist with mood disorders such as depression and anxiety. When these conditions are present, they can amply some of the debilitating symptoms and complicate recovery. Therefore, we strongly encourage our patients to work with their local physicians when mood symptoms are present. Psychiatry referrals can be facilitated by local physicians if necessary.     SLEEP  She has been diagnosed with possible narcolepsy, will meet with Sleep medicine to discuss diagnosis and treatment.    PHYSICAL AND OCCUPATIONAL THERAPY  Many patients will benefit from a brief period of targeted physical therapy for 8-12 weeks to improve musculoskeletal symptoms. Additionally, occupational therapy can be helpful for relaxation, stress management, life skills, and energy conservation. Referrals can be facilitated locally by the primary care provider.    EXERCISE  Above all else, she needs to begin cardio and weight training. An exercise program either independently or supervised by a  or physical therapist should focus on proximal lower extremity resistance strength training as well as aerobic fitness.   Some patients can benefit initially by using local [facility] for supervised slow incremental reconditioning.    Cardio should be done for at least 30 minutes every day.   The target heart rate should be at least 130 and perhaps close to 160 bpm, followed by a long cool down to allow heart rate recovery before stopping.   Recumbent exercises are often better tolerated, including recumbent biking, rowing, or swimming (e.g., walking or  jogging in water).   Examples of cardio exercise include brisk walking, jogging, elliptical machine, stair stepper, upright stationary bicycle, biking outdoors, and group fitness classes at a local gym such as [employer], cycling class, or [employer].     1) Postural training: It is important to remain upright as much as possible during the day. Patients with orthostatic intolerance can improve their tolerance of orthostatic stress by increasing each day the time spent upright. Prolonged bedrest is best avoided, as this can lead to orthostatic deconditioning.    2) Strength training: Exercises that strengthen the thigh muscles, such as cycling, can be helpful. Weight training should target the proximal lower limbs and core with such exercises as leg presses, toe presses, leg extensions, leg curls. Yoga and Pilates can also be helpful.  Daily large muscle exercises facilitate venous return.  These include bicep curls with 3-5 pound weights, squats, and heel raises working up to 25 repetitions of each exercise.   These can be done as part of the morning getting-out-of-bed routine, improving symptoms for the next several hours.     MEDICATIONS  There is no evidence that pure opioids, such as morphine or oxycodone have any benefit in fibromyalgia. In addition, opioid-induced hyperalgesia is a serious concern, in which pain is worsened by opioid use. Although Tramadol has moderate evidence for efficacy, adverse events, drug cross-reactions, and opioid-induced hyperalgesia argue against its consistent use.     Modest improvement of fibromyalgia has been observed with drugs targeting a diverse range of molecular mechanisms. However, no single drug has offered substantial efficacy. The heterogeneity of this disorder and widely varied responses to medications suggests existence of patient subgroups. As a result, therapies for fibromyalgia need to recognize the impact of central and peripheral aspects of the pathophysiology  utilizing both medications and nonpharmacologic approaches.    From a medication perspective, FDA-approved medications for fibromyalgia include duloxetine, milnacipran, and pregabalin. Additional options (non-FDA approved) include gabapentin and the older tricyclic agents (amitriptyline, nortriptyline). Ideally it is best to start with a single medication and slowly titrate upwards to a therapeutic dose, attempting to minimize adverse effects. A combined medication approach can also be undertaken. The advantage of this approach is the use of two different medications with different mechanisms at lower doses to minimize side effects with potential additive benefit from an overall symptom standpoint.    A reasonable approach to medication initiation and titration is listed below. The rate of titration should be based on medication tolerability, presence of adverse effects, and the patient's sensitivity towards medications. Following medication initiation, a patient must be reevaluated by their local provider for efficacy and side effects.     FDA approved:  Duloxetine (SNRI): Best for pain + mood symptoms.  · Start at 20 mg daily, increase over several weeks-to-months to 60 mg   · Maximum dose: 60 mg daily    Milnacipran (SNRI): Best for pain + mood symptoms.  · Start at 6.25-12.5 mg daily, increase over several weeks-to-months 25-50 mg   · Maximum dose: 100 mg twice daily    Pregabalin (Alpha-2-delta calcium channel ligand): Best for pain + sleep + paresthesia symptoms.   · Start at 25 mg daily, increase over several weeks-to-months to 50-75 mg   · Maximum dose: 225 mg twice daily    Non-FDA approved:  Gabapentin: Best for pain + sleep + paresthesia symptoms.  · Start at 100-300 mg nightly, increase over several weeks-to-months to 600 mg   · If efficacious, a morning or afternoon dose can be started with gradual uptitration  · Maximum dose: 2400 mg total per day    Amitriptyline or Nortriptyline: Best for pain +  sleep + paresthesia symptoms.  · Start at 10 mg nightly, and increase over several weeks-to-months to 50 mg  · Maximum dose: 75 mg daily    COMBINED MEDICATION APPROACH  A combination of low dose SNRI (Duloxetine 30 mg daily, or Milnacipran 12.5-25 mg daily) along with a low dose of pregabalin or gabapentin (pregabalin 50-75 mg or gabapentin 300 mg at night) could be considered. The advantage of this approach is use of two different medications with different mechanisms at lower doses to minimize side effects with potential additive benefit from an overall symptom standpoint.     DIET  Low inflammation diets can be helpful, such as the Mediterranean diet.  There is some recent evidence that probiotics alters the intestinal microbiome, which has considerable effects on the enteric nervous system connections with the central nervous system, including direct effects on intestinal function, mood and anxiety.  Probiotics, especially when coupled with prebiotic, can also be helpful.   Fermented milk products such as Activia yogurt or Jemma's Kefir are useful examples. Notably, increased fiber, a prebiotic, can also be helpful at reducing inflammation, according to studies on osteoarthritis.    FATIGUE  Oncologists have been recommending ginseng for cancer patients to treat their chronic fatigue,   The mechanism for cancer-related fatigue appears to be the same as the proposed origin of chronic fatigue.  \"The mechanism by which American ginseng may be able to moderate fatigue is evidenced by preclinical data. Several investigators have established a consistent link between CRF and inflammation and have provided data to support dysregulation of the hypothalamic pituitary adrenal axis. These data suggest that chronic fatigue in cancer is associated with an inability for the hypothalamic pituitary adrenal axis to regulate inflammatory processes and that concentrations of inflammatory cytokines remain elevated instead of  reachieving homeostasis. Preclinical data evaluating the biologic activity of ginseng have demonstrated the ability of ginseng to downregulate inflammatory pathways, decrease inflammation, and modulate cortisol and the impact of chronic stress on the hypothalamic pituitary adrenal axis.\"  DOSE:  1,000 mg taken 2 times per day, at breakfast and lunch (before noon to ensure it doesn't negatively impact sleep although this has not been reported).  Vitamin C 1 g daily may help with reduction of tumor necrosis factor (TNF), an inflammatory cytokine known to be active in chronic fatigue symptoms.  Vitamin B6 is known to reduce inflammatory cytokines in inflammatory disorder such as rheumatoid arthritis. Recommended daily allowance (RDA) for oral intake is:  19 to 50 years: 1.3 mg per day.  >51 years: Females: 1.5 mg, Males: 1.7 mg per day.  Vitamin B6 deficiency can be treated with 100 mg daily for 4 weeks.  Excessive intake can cause neurologic affects such as neuropathy symptoms.

## 2025-02-10 NOTE — PROGRESS NOTES
[facility], [address] [place] [address]      Consult     Tessa Stapleton Patient Status:  No patient class for patient encounter    1990 MRN [id number]   Location [facility], [address] [place] [address] Attending No att. providers found   Hosp Day # 0 PCP Daniel Barkley MD     Referring Provider: PCP    Reason for Consultation: Joint pain      Subjective:    Tessa Stapleton is a 35 year old female with comes in for further evaluation of joint pain and arthralgias.    Has been told she may have hypermobility syndrome.     She had extreme body pain and spasm and went to [facility] in 2016 (arthragias, shaking; rolling eyes, fatigue) (no kids) at that time.     Went there for for workup and saw functional doctor at that time.     All the testing was okay. Ended up helping was zoloft and helped symptoms but never officially diagnosed with anything    Over the years hormone testing was unrevealing (mentrual periods) PMS (estrogen dominant) had uternine polyp (before or after hospital evaluation)    NO functional doctor seen again. Still having menstrual periods (regularly) some clots; some changes     Had kids in 2018. Pain has worsened     Has on and off pain in her shoulders, elbows , wrists or hands ; knee pain; low back; hip pain    +neck (stiffness, soreness; ashiness; no burning or shooting     No swelling of joints; standing and temperature (changes) stays for a while; eventually goes away    Swelling in pregnancy lower legs (stockings) and has varicose veins and spider veins.     Denies any history of DVT PE TIA CVA seizures +migraines + headaches (happens 1-2 times a month)    Lay down sleep and NSAIDS (takes care of it for most part)    Esophageal strictures () (12 years) problems swallowing food;     Since dilations improved (last surgery May 2023) Granular cell tumor removed (stent) placed     Landed in hospital for 4 days (may 2024) stent taken out;  endoscopy (pending) ; barium study was okay (no perforation) no dysmolity; no major issues; has some reflux? Has history of hiatal hernia    On elimination diet; abdominal pain constipation (worse) chronic on and off (since 2018) before was diarrhea (no colonscopy) IBS? NO blood in stools     States no shortness of breath ,chest pain ,fevers ,chills (march 2023) K was low (palpiations) around 3.3 Jan 17th 2024    States no urinary +bowel symptoms; NO bloody stools    + jaw pain (grinding teeth) on mouth guards; saw ENT as well for congestion (allergy testing) negative,   No vision changes    History depression anxiety (working)  or insomnia (fatigue); nonrestortive sleep; last sleep study (sleep apnea) no repeat testing; no cpap   Has seen ortho and pain management (saw that doctor) MRI in past and epidural SI jiont on right a month ago with some improvement.     Knee injection in May 2024  IMPRESSION:    1. No MRI evidence of meniscal tears, as questioned.    2. Patellar chondromalacia, grossly stable.    3. Focal reactive edema of the superolateral corner of the infrapatellar fat (Hoffa fat-pad) which  may be related to Patellar Tendon-Lateral Femoral Condyle Friction Syndrome     Patient seen as a new patient August 2024    Diagnosed with osteoarthritis and fibromyalgia    Offered gabapentin which she has uptitrated to 200 mg at bedtime with improvement in her neuropathy and pain and some of her nonrestorative sleep and fatigue    Multiple stressors with recent infections and illness with her kids and the family.  Her kids are 4 and 6 years old.    She is considering if her pain worsens to further uptitrate but she did have some mild grogginess when she initially went up to the 200 mg at bedtime    She is hoping to add some stretching and yoga and continued exercise    Extensive autoimmune workup unrevealing.  She is agreeable with holding off on updating labs at this time since they are completely  normal    History/Other:      Past Medical History:  Past Medical History:    ALLERGIC RHINITIS    Anxiety    Cyst, thyroid    has appt to see Endocrinologist February 2017    Endometrial hyperplasia    encapsalated placenta    Eosinophilic esophagitis    HEADACHES    migraines    Homozygous Factor V Leiden mutation (HCC)    Polyp at cervical os    uterine polyp removal        Past Surgical History:   Past Surgical History:   Procedure Laterality Date    D & c  02/2016    with hysteroscopy, benign uterine polyp    Hysteroscopy  2018    D&C encapsalated placenta    Other surgical history  12/2015    wisdom teeth    Tonsillectomy  1991       Social History:  reports that she has never smoked. She has never used smokeless tobacco. She reports that she does not currently use alcohol. She reports that she does not use drugs.    Family History:   Family History   Problem Relation Age of Onset    Cancer Maternal Grandfather         liver    Anxiety Maternal Grandfather     Cancer Paternal Grandmother         colon    Diabetes Paternal Grandfather     Heart Disorder Paternal Grandfather     Anxiety Mother     Other (Other) Mother         hypothyroid    Anxiety Maternal Grandmother        Allergies:   Allergies   Allergen Reactions    Cefdinir RASH    Cefuroxime Axetil RASH    Venlafaxine OTHER (SEE COMMENTS)     shakey    Zithromax HIVES    Cefotetan RASH    Cefuroxime RASH       Current Medications:  Current Outpatient Medications   Medication Sig Dispense Refill    gabapentin 100 MG Oral Cap Take 3 capsules (300 mg total) by mouth nightly. 90 capsule 5    pantoprazole 40 MG Oral Tab EC Take 1 tablet (40 mg total) by mouth 2 (two) times daily before meals. As needed      sertraline 100 MG Oral Tab Take 1.5 tablets (150 mg total) by mouth.      folic acid 1 MG Oral Tab Take 1 tablet (1 mg total) by mouth daily. (Patient not taking: Reported on 2/10/2025) 30 tablet 11      No current facility-administered medications for  this visit.     (Not in a hospital admission)      Review of Systems:     Constitutional: Negative for chills, , +fatigue, fever and unexpected weight change.    HENT: Negative for congestion, and mouth sores.    Eyes: Negative for photophobia, pain, redness and visual disturbance.    Respiratory: Negative for apnea, cough, chest tightness, shortness of breath, wheezing and stridor.    Cardiovascular: Negative for chest pain, palpitations and leg swelling.    Gastrointestinal: + abdominal distention, periods of abdominal pain, NO blood in stool, alternating constipation, diarrhea and nausea.    Endocrine: Negative.     Genitourinary: Negative for decreased urine volume, difficulty urinating, dyspareunia, dysuria, flank pain, and frequency.    Musculoskeletal: + arthralgias, no gait problem and joint swelling.    Skin: Negative for color change, pallor and rash. + raynauds NO digital ulcerations no sclerodactly.    Allergic/Immunologic: Negative.    Neurological: Negative for dizziness, tremors, seizures, syncope, speech difficulty, weakness, light-headedness, occasional numbness and headaches.    Hematological: Does not bruise/bleed easily.    Psychiatric/Behavioral: Negative for confusion, decreased concentration, hallucinations, self-injury, +sleep disturbance and no suicidal ideas or depression.    Objective:   Vitals:    02/10/25 1252   BP: 100/70   Pulse: 76   Resp: 16   Temp: 98 °F (36.7 °C)          Constitutional: is oriented to person, place, and time. Appears well-developed and well-nourished. No distress.    HEENT: Normocephalic; EOMI; no jvd; no LAD; no oral or nasal ulcers.     Eyes: Conjunctivae and EOM are normal. Pupils are equal, round, and reactive to light.     Neck: Normal range of motion. No thyromegaly present.    Cardiovascular: RRR, no murmurs.    Lungs: Clear, Bilateral air entry, no wheezes.    Abdominal: Soft.    Musculoskeletal:         Joint Exam 02/10/2025        Right  Left    Sternoclavicular   Tender   Tender   Elbow   Tender      Thoracic Spine   Tender      Lumbar Spine   Tender      Sacroiliac   Tender   Tender        Swollen: 0      Tender: 7          Right shoulder: Exhibits normal range of motion on abduction and internal rotation, no tenderness, no bony tenderness, no deformity, no laceration, no pain and no spasm.        Left shoulder: Exhibits normal range of motion on abduction and internal and external rotation.  no tenderness, no bony tenderness, no swelling, no effusion, no deformity, no pain, no spasm and normal strength.        Right elbow:  Exhibits normal range of motion, no swelling, no effusion and no deformity. No tenderness found. No medial epicondyle, no lateral epicondyle and no olecranon process tenderness noted. There are no contractures or tophi or nodules.        Left elbow:  Normal range of motion, no swelling, no effusion and no deformity. No medial epicondyle, no lateral epicondyle and no olecranon process tenderness noted. There are no contractures or tophi or nodules.        Right wrist:  Exhibits normal range of motion, no tenderness, no bony tenderness, no swelling, no effusion and no crepitus. Flexion and extension intact w/o limitation.        Left wrist: Exhibits normal range of motion, no tenderness, no bony tenderness, no swelling, no effusion, no crepitus and no deformity. Flexion and extension intact without limitation.        Right hip: Exhibits normal range of motion, normal strength, no tenderness, no bony tenderness, no swelling and no crepitus.        Right hand: No synovitis of MCP,PIP or DIP joints; no Bouchards or Heberden nodules noted;  strength: 100%.        Left hand: No synovitis of MCP,PIP or DIP joints; no Bouchards or Heberden nodules noted;  strength: 100%.        Left hip: Exhibits normal range of motion, normal strength, no tenderness, no bony tenderness, No swelling and no crepitus.        Right knee: Exhibits normal  range of motion, no swelling, no effusion, no ecchymosis, no deformity and no erythema. No tenderness found. No medial joint line, no lateral joint line, no MCL and no LCL tenderness noted. No crepitation on flexion of knee and extension normal.        Left knee:  Exhibits normal range of motion, no swelling, no effusion, no ecchymosis and no erythema. No tenderness found. No medial joint line, no lateral joint line and no patellar tendon tenderness noted. No crepitation on flexion of the knee. Extension intact and normal.        Right ankle: No swelling, no deformity. No tenderness. Dorsiflexion and plantar flexion intact without limitation in range of motion.        Left ankle: Exhibits no swelling. No tenderness. No lateral malleolus and no medial malleolus tenderness found. Achilles tendon normal. Achilles tendon exhibits no pain, no defect and normal Noonan's test results.  Dorsiflexion and plantar flexion intact without limitation in range of motion.        Cervical back: Exhibits normal range of motion, no tenderness, no bony tenderness, no swelling, no pain and + spasm.        Thoracic back: Exhibits normal range of motion, no tenderness, no bony tenderness and+ spasm.        Lumbar back:  Exhibits normal range of motion, no tenderness, no bony tenderness, no pain and no spasm.        Right foot: normal. There is normal range of motion, no tenderness, no bony tenderness, no crepitus and no laceration. There is no synovitis or tenderness of the MTP joints to palpation.        Left foot: normal. There is normal range of motion, no tenderness, no bony tenderness and no crepitus. There is no synovitis or tenderness of the MTP joints to palpation.    Lymphadenopathy: No submental, no submandibular, and no occipital adenopathy present, has no cervical adenopathy or axillary lympadenopathy.    Neurological: Alert and oriented. No focal motor or sensory abnormalities. Strength is 5/5 Upper Extremities/Lower  Extremities proximally and distally.    Skin: Skin is warm, dry and intact.  Mild Raynaud's of the feet    Psychiatric: Normal behavior.    Results:    Labs:      Lab Results   Component Value Date    WBC 4.0 07/17/2024    RBC 4.28 07/17/2024    HGB 13.6 07/17/2024    HCT 40.9 07/17/2024    MCV 95.6 07/17/2024    MCH 31.8 07/17/2024    MCHC 33.3 07/17/2024    RDW 12.9 07/17/2024    .0 (L) 07/17/2024       No components found for: \"RELY\", \"NMET\", \"MYEL\", \"PROMY\", \"MALINI\", \"ABSNEUTS\", \"ABSBANDS\", \"ABMM\", \"ABMY\", \"ABPM\", \"ABBL\"      Lab Results   Component Value Date     01/17/2024    K 3.3 (L) 01/17/2024    CO2 30.0 01/17/2024    BUN 10 01/17/2024    GFR >60 07/11/2008    ALB 4.43 08/14/2024    AST 7 (L) 01/17/2024    ALT 18 01/17/2024          No components found for: \"ESRWESTERGRN\"       Lab Results   Component Value Date    CRP <0.40 08/14/2024         Lab Results   Component Value Date    CLARITY Clear 08/14/2024    UROBILINOGEN Normal 08/14/2024     @LABRCNTIP(RF,B12)@      [unfilled]    Imaging:  XR FINGER(S) (MIN 2 VIEWS), LEFT 3RD (CPT=73140)    Result Date: 1/5/2025  CONCLUSION:  No acute osseous findings  Preliminary report provided by [facility] at time of examination.    LOCATION:  [place]   Dictated by (CST): Filippo Dumont MD on 1/05/2025 at 7:27 AM     Finalized by (CST): Filippo Dumont MD on 1/05/2025 at 7:28 AM       MRI FOOT, RIGHT (CPT=73718)    Result Date: 12/17/2024  CONCLUSION:  1. Mild nonspecific joint effusion of the 1st MTP joint.  No vanesa chondromalacia or osteoarthritis noted.  2. No acute osseous or soft tissue injuries.   LOCATION:  [facility]   Dictated by (CST): Brian Carrillo DO on 12/17/2024 at 3:34 PM     Finalized by (CST): Brian Carrillo DO on 12/17/2024 at 3:38 PM       CT SOFT TISSUE OF NECK (CPT=70490)    Result Date: 12/3/2024  CONCLUSION:  1. No evidence for acute or chronic sinusitis. 2. Unremarkable noncontrast CT of the neck.       LOCATION:  [facility]     Dictated by (CST): Rian Stoddard MD on 12/03/2024 at 6:24 PM     Finalized by (CST): Rian Stoddard MD on 12/03/2024 at 6:30 PM       XR TOE(S) (MIN 2 VIEWS), RIGHT 1ST (CPT=73660)    Result Date: 11/19/2024  CONCLUSION:  See above.   LOCATION:  [place] [id number]   Dictated by (CST): Gunner Shaver MD on 11/19/2024 at 5:15 PM     Finalized by (CST): Gunner Shaver MD on 11/19/2024 at 5:15 PM        Assessment & Plan:      35-year-old woman comes in for further evaluation of chronic joint pain    Fibromyalgia with multiple tender points nonrestorative sleep fatigue element of anxiety history of depression possible IBS  Raynaud's without digital ulceration  Mild osteoarthritis    Patient has no obvious synovitis on exam or history concerning for connective tissue disease  Extensive autoimmune workup normal August 2024    Raynaud's with minimal symptom score measures discussed    Continue Zoloft 100 mg daily    Consider switch to Cymbalta 60 mg daily in the future through PCP    She has improvement on gabapentin 200 mg at bedtime.  She feels she is doing okay we will consider increasing to 300 mg at bedtime as tolerated.  Potential risk side effects discussed     encouraged exercise david chi yoga aquatic therapy stretching to be incorporating to her daily regimen    She has also have establish follow-up with physiatrist pain management physician which she will continue to follow-up with    Discussed importance of addressing her insomnia fatigue getting another sleep study and readdressing this through her PCP.  If she cannot tolerate gabapentin defer further management of her fibromyalgia to her PCP consider seeing psychiatry    Complete autoimmune workup and monitor for evolving connective tissue disease every 6 months in the meantime    She stays on gabapentin I will see her back in 6 months    I see no reason to update her autoimmune workup at this time consider this next visit encouraged her to increase vitamin D intake to 3  to 5000 IU D3    Education and counseling provided to patient.  Instructed patient to call my office or seek medical attention immediately if symptoms worsen. Risks and side effects of medications and diagnosis discussed in detail and patient was given written information on new prescribed medications.    Return to clinic:  Return in about 6 months (around 8/10/2025).    Brenda Ruano MD  8/6/2024

## 2025-03-21 ENCOUNTER — OFFICE VISIT (OUTPATIENT)
Dept: SURGERY | Facility: CLINIC | Age: 35
End: 2025-03-21
Payer: COMMERCIAL

## 2025-03-21 VITALS
WEIGHT: 142 LBS | DIASTOLIC BLOOD PRESSURE: 62 MMHG | SYSTOLIC BLOOD PRESSURE: 104 MMHG | BODY MASS INDEX: 22.29 KG/M2 | HEIGHT: 67 IN

## 2025-03-21 DIAGNOSIS — N92.4 EXCESSIVE BLEEDING IN PREMENOPAUSAL PERIOD: ICD-10-CM

## 2025-03-21 DIAGNOSIS — Z87.42 HISTORY OF ENDOMETRIAL HYPERPLASIA: ICD-10-CM

## 2025-03-21 DIAGNOSIS — Z01.419 WOMEN'S ANNUAL ROUTINE GYNECOLOGICAL EXAMINATION: Primary | ICD-10-CM

## 2025-03-21 PROBLEM — E34.9 HORMONE IMBALANCE: Status: ACTIVE | Noted: 2025-03-21

## 2025-03-21 PROCEDURE — 3008F BODY MASS INDEX DOCD: CPT | Performed by: OBSTETRICS & GYNECOLOGY

## 2025-03-21 PROCEDURE — G2211 COMPLEX E/M VISIT ADD ON: HCPCS | Performed by: OBSTETRICS & GYNECOLOGY

## 2025-03-21 PROCEDURE — 99385 PREV VISIT NEW AGE 18-39: CPT | Performed by: OBSTETRICS & GYNECOLOGY

## 2025-03-21 PROCEDURE — 3078F DIAST BP <80 MM HG: CPT | Performed by: OBSTETRICS & GYNECOLOGY

## 2025-03-21 PROCEDURE — 99204 OFFICE O/P NEW MOD 45 MIN: CPT | Performed by: OBSTETRICS & GYNECOLOGY

## 2025-03-21 PROCEDURE — 3074F SYST BP LT 130 MM HG: CPT | Performed by: OBSTETRICS & GYNECOLOGY

## 2025-03-21 NOTE — PROGRESS NOTES
NEW GYN H&P     3/21/2025  1:05 PM    Chief Complaint   Patient presents with    New Patient     New pt here for annual exam, pt would like to discuss hormonal imbalance        HPI: Patient is a 35 year old  LMP 3/2/25 here to establish care - due for annual exam with history of estrogen dominant hormonal imbalance  and concerns about heavy periods. Has been under Integrative Medicine care with Sienna Desai and plans future follow up to address ongoing hormonal issues. History significant for Factor V Leiden disease and past endometrial hyperplasia without atypia found on HSC polyp removal. Cycles are painful and heavy with clots - no surveillance to date of past endometrial hyperplasia with imaging or biopsies. Recommended assessment of uterine architecture and EM lining with obtaining a pelvic TVUSN then returning to review and possible tissue sampling per results.     Patient's last menstrual period was 2025 (exact date).    OB History    Para Term  AB Living   2 2 2 0 0 2   SAB IAB Ectopic Multiple Live Births   0 0 0   2      # Outcome Date GA Lbr Julian/2nd Weight Sex Type Anes PTL Lv   2 Term 20 37w6d 09:15 / 02:06 7 lb 12 oz (3.515 kg) M Vag-Vacuum EPI N GUSTABO   1 Term 18   7 lb 4 oz (3.289 kg) F Vag-Vacuum  N GUSTABO       GYN hx:    Hx Prior Abnormal Pap: No  Pap Date: 24  Pap Result Notes: wnl/-hpv  Follow Up Recommendation: Last Mammo: 2024 Left breast  BX done [facility]  CONTRACEPTION: Condoms  LAST MAMMOGRAM: 2024      Current Outpatient Medications   Medication Sig Dispense Refill    gabapentin 100 MG Oral Cap Take 3 capsules (300 mg total) by mouth nightly. 90 capsule 5    pantoprazole 40 MG Oral Tab EC Take 1 tablet (40 mg total) by mouth 2 (two) times daily before meals. As needed      sertraline 100 MG Oral Tab Take 1.5 tablets (150 mg total) by mouth.      folic acid 1 MG Oral Tab Take 1 tablet (1 mg total) by mouth daily. (Patient not taking: Reported  on 11/19/2024) 30 tablet 11       Past Medical History:    ALLERGIC RHINITIS    Anxiety    H/X    Cyst, thyroid    has appt to see Endocrinologist February 2017    Endometrial hyperplasia    encapsalated placenta    Eosinophilic esophagitis    Fibromyalgia    HEADACHES    migraines    Homozygous Factor V Leiden mutation (HCC)    Polyp at cervical os    uterine polyp removal     Past Surgical History:   Procedure Laterality Date    Breast biopsy Left 2024    D & c  02/2016    with hysteroscopy, benign uterine polyp    Egd  2024    x 7 with benign tumor removed    Hysteroscopy  2018    D&C encapsalated placenta    Other surgical history  12/2015    wisdom teeth    Tonsillectomy  1991     Allergies[1]  Family History   Problem Relation Age of Onset    Anxiety Mother     Other (Other) Mother         hypothyroid    Anxiety Maternal Grandmother     Cancer Maternal Grandfather         liver    Anxiety Maternal Grandfather     Colon Cancer Paternal Grandmother     Cancer Paternal Grandmother         colon    Ovarian Cancer Paternal Grandfather     Diabetes Paternal Grandfather     Heart Disorder Paternal Grandfather     Breast Cancer Maternal Aunt     Uterine Cancer Neg      Social History     Socioeconomic History    Marital status:    Tobacco Use    Smoking status: Never    Smokeless tobacco: Never   Vaping Use    Vaping status: Never Used   Substance and Sexual Activity    Alcohol use: Not Currently     Comment: occ     Drug use: No    Sexual activity: Yes     Partners: Male     Birth control/protection: Condom     Social History     Social History Narrative    Not on file       ROS:     Review of Systems:  A comprehensive 10 point ROS was completed. All pertinent positives and negatives noted in the HPI.     /62   Ht 67\"   Wt 142 lb (64.4 kg)   LMP 03/02/2025 (Exact Date)   BMI 22.24 kg/m²     Exam:   GENERAL: well developed, well nourished, in no apparent distress  SKIN: no rashes, no lesions  HEENT:  normal  LUNGS: respiration unlabored  CARDIOVASCULAR: no peripheral edema or varicosities, skin warm and dry  BREASTS: bilaterally nontender, no palpable masses, no nipple discharge, no skin changes, no axillary adenopathy  ABDOMEN: Soft, non distended; non tender, no masses  GYNE/:   External Genitalia: normal, no lesions, good perineal support  Urethra: meatus normal   Bladder: well supported  Vagina: normal mucosa, no lesions, no discharge   Uterus: normal size, mobile, nontender  Cervix: normal os, no lesions or bleeding  Adnexa:normal size, bilaterally nontender, no palpable masses  Cul-de-sac: normal  R/V: normal perineum, no hemorrhoids  EXTREMITIES:  nontender without edema      A/P: Patient is 35 year old female     1. Women's annual routine gynecological examination  - Normal exam    2. Menorrhagia  - Pelvic TVUSN ordered      3. History of endometrial hyperplasia  - Possible EMBx and/or progestin IUD per USN findings      Total time spent = 45 minutes  >50% visit = face to face counseling and coordination of care        3/21/2025  Kat Morel MD                    [1]   Allergies  Allergen Reactions    Cefdinir RASH    Cefuroxime Axetil RASH    Venlafaxine OTHER (SEE COMMENTS)     shakey    Zithromax HIVES    Cefotetan RASH    Cefuroxime RASH

## 2025-04-03 ENCOUNTER — HOSPITAL ENCOUNTER (OUTPATIENT)
Dept: ULTRASOUND IMAGING | Age: 35
Discharge: HOME | End: 2025-04-03
Attending: OBSTETRICS & GYNECOLOGY
Payer: COMMERCIAL

## 2025-04-03 DIAGNOSIS — N92.4 EXCESSIVE BLEEDING IN PREMENOPAUSAL PERIOD: ICD-10-CM

## 2025-04-03 PROCEDURE — 76830 TRANSVAGINAL US NON-OB: CPT | Performed by: OBSTETRICS & GYNECOLOGY

## 2025-04-03 PROCEDURE — 76856 US EXAM PELVIC COMPLETE: CPT | Performed by: OBSTETRICS & GYNECOLOGY

## 2025-04-13 ENCOUNTER — OFFICE VISIT (OUTPATIENT)
Dept: FAMILY MEDICINE CLINIC | Facility: CLINIC | Age: 35
End: 2025-04-13
Payer: COMMERCIAL

## 2025-04-13 VITALS
SYSTOLIC BLOOD PRESSURE: 100 MMHG | BODY MASS INDEX: 22.39 KG/M2 | TEMPERATURE: 98 F | HEART RATE: 86 BPM | OXYGEN SATURATION: 98 % | WEIGHT: 142.63 LBS | DIASTOLIC BLOOD PRESSURE: 62 MMHG | HEIGHT: 67 IN | RESPIRATION RATE: 16 BRPM

## 2025-04-13 DIAGNOSIS — J06.9 URI WITH COUGH AND CONGESTION: Primary | ICD-10-CM

## 2025-04-13 DIAGNOSIS — J02.9 SORE THROAT: ICD-10-CM

## 2025-04-13 LAB
CONTROL LINE PRESENT WITH A CLEAR BACKGROUND (YES/NO): YES YES/NO
KIT LOT #: NORMAL NUMERIC
STREP GRP A CUL-SCR: NEGATIVE

## 2025-04-13 PROCEDURE — 3074F SYST BP LT 130 MM HG: CPT | Performed by: NURSE PRACTITIONER

## 2025-04-13 PROCEDURE — 3008F BODY MASS INDEX DOCD: CPT | Performed by: NURSE PRACTITIONER

## 2025-04-13 PROCEDURE — 99213 OFFICE O/P EST LOW 20 MIN: CPT | Performed by: NURSE PRACTITIONER

## 2025-04-13 PROCEDURE — 87880 STREP A ASSAY W/OPTIC: CPT | Performed by: NURSE PRACTITIONER

## 2025-04-13 PROCEDURE — 3078F DIAST BP <80 MM HG: CPT | Performed by: NURSE PRACTITIONER

## 2025-04-13 RX ORDER — BENZONATATE 200 MG/1
200 CAPSULE ORAL 3 TIMES DAILY PRN
Qty: 20 CAPSULE | Refills: 0 | Status: SHIPPED | OUTPATIENT
Start: 2025-04-13 | End: 2025-04-20

## 2025-04-13 RX ORDER — ALBUTEROL SULFATE 90 UG/1
2 INHALANT RESPIRATORY (INHALATION) EVERY 4 HOURS PRN
Qty: 1 EACH | Refills: 0 | Status: SHIPPED | OUTPATIENT
Start: 2025-04-13 | End: 2025-04-27

## 2025-04-13 NOTE — PROGRESS NOTES
CHIEF COMPLAINT:     Chief Complaint   Patient presents with    Cough     Cough and sore throat since Wednesday        HPI:   Tessa Stapleton is a 35 year old female who presents for upper respiratory symptoms for  4 days. Patient reports sore throat, congestion, dry cough. Symptoms have been consistent since onset.  Treating symptoms with OTC.  Hx of EOE and not sure if she is feeling that \"flare up\" in her throat.  Denies fever.      Current Medications[1]   Past Medical History[2]   Past Surgical History[3]      Short Social Hx on File[4]      REVIEW OF SYSTEMS:   GENERAL: no change in appetite  SKIN: no rashes or abnormal skin lesions  HEENT: See HPI  LUNGS: See HPI  CARDIOVASCULAR: denies chest pain or palpitations   GI: denies N/V/C or abdominal pain      EXAM:   /62   Pulse 86   Temp 98.1 °F (36.7 °C) (Oral)   Resp 16   Ht 5' 7\" (1.702 m)   Wt 142 lb 9.6 oz (64.7 kg)   LMP 03/27/2025 (Exact Date)   SpO2 98%   BMI 22.33 kg/m²   GENERAL: well developed, well nourished,in no apparent distress  SKIN: no rashes,no suspicious lesions  HEAD: atraumatic, normocephalic.  no tenderness on palpation of sinuses  EYES: conjunctiva clear, EOM intact  EARS: TM's without erythema, no bulging, no retraction,no fluid, bony landmarks visible  NOSE: Nostrils patent, clear nasal discharge, nasal mucosa mildly inflamed   THROAT: Oral mucosa pink, moist. Posterior pharynx is not erythematous. no exudates. Tonsils 0/4.    NECK: Supple, non-tender  LUNGS: clear to auscultation bilaterally, no wheezes or rhonchi. Breathing is non labored.  CARDIO: RRR without murmur  EXTREMITIES: no cyanosis, clubbing or edema  LYMPH:  no cervical lymphadenopathy.        Recent Results (from the past 24 hours)   Strep A Assay W/Optic    Collection Time: 04/13/25 12:00 PM   Result Value Ref Range    Strep Grp A Screen negative Negative    Control Line Present with a clear background (yes/no) yes Yes/No    Kit Lot # [id number] Numeric     Kit Expiration Date 12/20/2025 Date       ASSESSMENT AND PLAN:   Tessa Stapleton is a 35 year old female who presents with upper respiratory symptoms that are consistent with    ASSESSMENT:   Encounter Diagnoses   Name Primary?    Sore throat     URI with cough and congestion Yes       PLAN: Meds as below.  Comfort care as described in Patient Instructions    Meds & Refills for this Visit:  Requested Prescriptions     Signed Prescriptions Disp Refills    benzonatate 200 MG Oral Cap 20 capsule 0     Sig: Take 1 capsule (200 mg total) by mouth 3 (three) times daily as needed.    albuterol 108 (90 Base) MCG/ACT Inhalation Aero Soln 1 each 0     Sig: Inhale 2 puffs into the lungs every 4 (four) hours as needed for Wheezing or Shortness of Breath.     Risks, benefits, and side effects of medication explained and discussed.    The patient indicates understanding of these issues and agrees to the plan.  The patient is asked to f/u with PCP if sx's persist or worsen.  There are no Patient Instructions on file for this visit.             [1]   Current Outpatient Medications   Medication Sig Dispense Refill    benzonatate 200 MG Oral Cap Take 1 capsule (200 mg total) by mouth 3 (three) times daily as needed. 20 capsule 0    albuterol 108 (90 Base) MCG/ACT Inhalation Aero Soln Inhale 2 puffs into the lungs every 4 (four) hours as needed for Wheezing or Shortness of Breath. 1 each 0    gabapentin 100 MG Oral Cap Take 3 capsules (300 mg total) by mouth nightly. 90 capsule 5    folic acid 1 MG Oral Tab Take 1 tablet (1 mg total) by mouth daily. (Patient not taking: Reported on 11/19/2024) 30 tablet 11    pantoprazole 40 MG Oral Tab EC Take 1 tablet (40 mg total) by mouth 2 (two) times daily before meals. As needed      sertraline 100 MG Oral Tab Take 1.5 tablets (150 mg total) by mouth.     [2]   Past Medical History:   ALLERGIC RHINITIS    Anxiety    H/X    Cyst, thyroid    has appt to see Endocrinologist February 2017     Endometrial hyperplasia    hyperplastic EM polyp    Eosinophilic esophagitis    Fibromyalgia    HEADACHES    migraines    Homozygous Factor V Leiden mutation (HCC)    Polyp at cervical os    uterine polyp removal    Simple endometrial hyperplasia without atypia   [3]   Past Surgical History:  Procedure Laterality Date    Breast biopsy Left 2024    D & c  02/2016    with hysteroscopy, uterine polyp    Egd  2024    x 7 with benign tumor removed    Hysteroscopy  2018    D&C encapsalated placenta    Other surgical history  12/2015    wisdom teeth    Tonsillectomy  1991   [4]   Social History  Socioeconomic History    Marital status:    Tobacco Use    Smoking status: Never    Smokeless tobacco: Never   Vaping Use    Vaping status: Never Used   Substance and Sexual Activity    Alcohol use: Not Currently     Comment: occ     Drug use: No    Sexual activity: Yes     Partners: Male     Birth control/protection: Condom     Social Drivers of Health     Food Insecurity: No Food Insecurity (3/21/2025)    NCSS - Food Insecurity     Worried About Running Out of Food in the Last Year: No     Ran Out of Food in the Last Year: No   Transportation Needs: No Transportation Needs (3/21/2025)    NCSS - Transportation     Lack of Transportation: No   Housing Stability: Not At Risk (3/21/2025)    NCSS - Housing/Utilities     Has Housing: Yes     Worried About Losing Housing: No     Unable to Get Utilities: No

## 2025-08-05 ENCOUNTER — TELEPHONE (OUTPATIENT)
Dept: ORTHOPEDICS CLINIC | Facility: CLINIC | Age: 35
End: 2025-08-05

## 2025-08-05 DIAGNOSIS — M25.561 RIGHT KNEE PAIN, UNSPECIFIED CHRONICITY: Primary | ICD-10-CM

## 2025-08-05 DIAGNOSIS — Z01.89 ENCOUNTER FOR LOWER EXTREMITY COMPARISON IMAGING STUDY: ICD-10-CM

## 2025-08-11 ENCOUNTER — HOSPITAL ENCOUNTER (OUTPATIENT)
Dept: GENERAL RADIOLOGY | Age: 35
Discharge: HOME | End: 2025-08-11
Attending: ORTHOPAEDIC SURGERY

## 2025-08-11 ENCOUNTER — OFFICE VISIT (OUTPATIENT)
Dept: ORTHOPEDICS CLINIC | Facility: CLINIC | Age: 35
End: 2025-08-11

## 2025-08-11 VITALS — BODY MASS INDEX: 22.76 KG/M2 | WEIGHT: 145 LBS | HEIGHT: 67 IN

## 2025-08-11 DIAGNOSIS — M22.41 CHONDROMALACIA, PATELLA, RIGHT: Primary | ICD-10-CM

## 2025-08-11 DIAGNOSIS — M25.561 RIGHT KNEE PAIN, UNSPECIFIED CHRONICITY: ICD-10-CM

## 2025-08-11 DIAGNOSIS — Z01.89 ENCOUNTER FOR LOWER EXTREMITY COMPARISON IMAGING STUDY: ICD-10-CM

## 2025-08-11 PROCEDURE — 73562 X-RAY EXAM OF KNEE 3: CPT | Performed by: ORTHOPAEDIC SURGERY

## 2025-08-11 PROCEDURE — 99205 OFFICE O/P NEW HI 60 MIN: CPT | Performed by: ORTHOPAEDIC SURGERY

## 2025-08-11 PROCEDURE — 3008F BODY MASS INDEX DOCD: CPT | Performed by: ORTHOPAEDIC SURGERY

## 2025-08-11 PROCEDURE — 73564 X-RAY EXAM KNEE 4 OR MORE: CPT | Performed by: ORTHOPAEDIC SURGERY

## (undated) NOTE — ED AVS SNAPSHOT
Angella Corrigan   MRN: [id number]    Department:  [facility] in [address] [place] [address]   Date of Visit:  2/8/2019           Disclosure     Insurance plans vary and the physician(s) referred by the ER may not be covered by your plan.  Please contac tell this physician (or your personal doctor if your instructions are to return to your personal doctor) about any new or lasting problems. The primary care or specialist physician will see patients referred from the [facility] Emergency Department.  Gerber Martinez

## (undated) NOTE — ED AVS SNAPSHOT
Delmar Finney   MRN: [id number]    Department:  [facility] in [place]   Date of Visit:  6/15/2019           Disclosure     Insurance plans vary and the physician(s) referred by the ER may not be covered by your plan.  Please conta tell this physician (or your personal doctor if your instructions are to return to your personal doctor) about any new or lasting problems. The primary care or specialist physician will see patients referred from the [facility] Emergency Department.  Goldy Arthur

## (undated) NOTE — ED AVS SNAPSHOT
Maxime Oh   MRN: [id number]    Department:  [facility] Department in [place]   Date of Visit:  1/7/2020           Disclosure     Insurance plans vary and the physician(s) referred by the ER may not be covered by your plan.  Please contac tell this physician (or your personal doctor if your instructions are to return to your personal doctor) about any new or lasting problems. The primary care or specialist physician will see patients referred from the [facility] Emergency Department.  Klarissa Veliz